# Patient Record
Sex: FEMALE | Race: WHITE | NOT HISPANIC OR LATINO | Employment: FULL TIME | URBAN - NONMETROPOLITAN AREA
[De-identification: names, ages, dates, MRNs, and addresses within clinical notes are randomized per-mention and may not be internally consistent; named-entity substitution may affect disease eponyms.]

---

## 2023-03-06 ENCOUNTER — TELEPHONE (OUTPATIENT)
Dept: PRIMARY CARE | Facility: CLINIC | Age: 48
End: 2023-03-06
Payer: COMMERCIAL

## 2023-03-06 DIAGNOSIS — G47.00 INSOMNIA, UNSPECIFIED TYPE: ICD-10-CM

## 2023-03-06 RX ORDER — ESZOPICLONE 3 MG/1
3 TABLET, FILM COATED ORAL NIGHTLY
Qty: 30 TABLET | Refills: 0 | Status: SHIPPED | OUTPATIENT
Start: 2023-03-06 | End: 2023-04-11 | Stop reason: SDUPTHER

## 2023-03-06 RX ORDER — ESZOPICLONE 3 MG/1
3 TABLET, FILM COATED ORAL NIGHTLY
COMMUNITY
End: 2023-03-06 | Stop reason: SDUPTHER

## 2023-04-11 ENCOUNTER — TELEPHONE (OUTPATIENT)
Dept: PRIMARY CARE | Facility: CLINIC | Age: 48
End: 2023-04-11

## 2023-04-11 DIAGNOSIS — G47.00 INSOMNIA, UNSPECIFIED TYPE: ICD-10-CM

## 2023-04-11 DIAGNOSIS — R73.03 PREDIABETES: ICD-10-CM

## 2023-04-11 PROBLEM — M25.561 RIGHT KNEE PAIN: Status: ACTIVE | Noted: 2023-04-11

## 2023-04-11 PROBLEM — M19.90 ARTHRITIS: Status: ACTIVE | Noted: 2023-04-11

## 2023-04-11 PROBLEM — M77.02 MEDIAL EPICONDYLITIS OF LEFT ELBOW: Status: ACTIVE | Noted: 2023-04-11

## 2023-04-11 PROBLEM — M25.529 JOINT PAIN, ELBOW: Status: ACTIVE | Noted: 2023-04-11

## 2023-04-11 PROBLEM — H69.91 DYSFUNCTION OF RIGHT EUSTACHIAN TUBE: Status: ACTIVE | Noted: 2023-04-11

## 2023-04-11 PROBLEM — M25.562 BILATERAL KNEE PAIN: Status: ACTIVE | Noted: 2023-04-11

## 2023-04-11 PROBLEM — R51.9 FREQUENT HEADACHES: Status: ACTIVE | Noted: 2023-04-11

## 2023-04-11 PROBLEM — M25.561 BILATERAL KNEE PAIN: Status: ACTIVE | Noted: 2023-04-11

## 2023-04-11 PROBLEM — M77.12 LATERAL EPICONDYLITIS OF LEFT ELBOW: Status: ACTIVE | Noted: 2023-04-11

## 2023-04-11 PROBLEM — M17.10 PRIMARY LOCALIZED OSTEOARTHROSIS OF LOWER LEG: Status: ACTIVE | Noted: 2023-04-11

## 2023-04-11 RX ORDER — ESZOPICLONE 3 MG/1
3 TABLET, FILM COATED ORAL NIGHTLY
Qty: 30 TABLET | Refills: 0 | Status: SHIPPED | OUTPATIENT
Start: 2023-04-11 | End: 2023-05-15 | Stop reason: SDUPTHER

## 2023-04-11 RX ORDER — TIRZEPATIDE 7.5 MG/.5ML
7.5 INJECTION, SOLUTION SUBCUTANEOUS
Qty: 2 ML | Refills: 1 | Status: SHIPPED | OUTPATIENT
Start: 2023-04-11 | End: 2023-04-22

## 2023-04-11 RX ORDER — TIRZEPATIDE 7.5 MG/.5ML
7.5 INJECTION, SOLUTION SUBCUTANEOUS
COMMUNITY
Start: 2023-03-17 | End: 2023-04-11 | Stop reason: SDUPTHER

## 2023-04-18 ENCOUNTER — TELEPHONE (OUTPATIENT)
Dept: PRIMARY CARE | Facility: CLINIC | Age: 48
End: 2023-04-18

## 2023-04-18 NOTE — TELEPHONE ENCOUNTER
Pt wants to know if she can move up to the next level higher for the 7.5mg  Mounjaro that she is taking.

## 2023-04-22 DIAGNOSIS — R73.03 PREDIABETES: Primary | ICD-10-CM

## 2023-04-22 RX ORDER — TIRZEPATIDE 10 MG/.5ML
10 INJECTION, SOLUTION SUBCUTANEOUS
Qty: 2 ML | Refills: 11 | Status: SHIPPED | OUTPATIENT
Start: 2023-04-22 | End: 2023-05-16 | Stop reason: SDUPTHER

## 2023-04-25 LAB
CHLAMYDIA TRACH., AMPLIFIED: NEGATIVE
CLUE CELLS: NORMAL
N. GONORRHEA, AMPLIFIED: NEGATIVE
NUGENT SCORE: 1
TRICHOMONAS VAGINALIS: NEGATIVE
URINE CULTURE: NORMAL
YEAST: NORMAL

## 2023-05-01 PROBLEM — H69.93 DYSFUNCTION OF BOTH EUSTACHIAN TUBES: Status: ACTIVE | Noted: 2020-06-29

## 2023-05-01 PROBLEM — M17.0 BILATERAL PRIMARY OSTEOARTHRITIS OF KNEE: Status: ACTIVE | Noted: 2020-01-29

## 2023-05-01 PROBLEM — G43.909 MIGRAINES: Status: ACTIVE | Noted: 2023-05-01

## 2023-05-01 PROBLEM — H93.8X1: Status: ACTIVE | Noted: 2020-06-29

## 2023-05-01 PROBLEM — Z96.652 STATUS POST TOTAL LEFT KNEE REPLACEMENT: Status: ACTIVE | Noted: 2021-02-22

## 2023-05-01 PROBLEM — H93.11 TINNITUS, RIGHT EAR: Status: ACTIVE | Noted: 2020-06-29

## 2023-05-01 PROBLEM — E66.813 OBESITY, CLASS III, BMI 40-49.9 (MORBID OBESITY): Status: ACTIVE | Noted: 2018-08-23

## 2023-05-01 PROBLEM — M17.12 PRIMARY OSTEOARTHRITIS OF LEFT KNEE: Status: ACTIVE | Noted: 2021-02-22

## 2023-05-01 PROBLEM — F41.9 ANXIETY: Status: ACTIVE | Noted: 2023-05-01

## 2023-05-01 PROBLEM — E66.01 OBESITY, CLASS III, BMI 40-49.9 (MORBID OBESITY) (MULTI): Status: ACTIVE | Noted: 2018-08-23

## 2023-05-11 ENCOUNTER — APPOINTMENT (OUTPATIENT)
Dept: PRIMARY CARE | Facility: CLINIC | Age: 48
End: 2023-05-11

## 2023-05-13 PROBLEM — M77.11 LATERAL EPICONDYLITIS OF RIGHT ELBOW: Status: ACTIVE | Noted: 2018-08-23

## 2023-05-13 RX ORDER — ESTRADIOL 1 MG/1
1 TABLET ORAL
COMMUNITY
Start: 2022-06-24 | End: 2023-11-28 | Stop reason: ALTCHOICE

## 2023-05-13 RX ORDER — PROGESTERONE 100 MG/1
100 CAPSULE ORAL
COMMUNITY
Start: 2022-06-24

## 2023-05-13 RX ORDER — DUTASTERIDE 0.5 MG/1
0.5 CAPSULE, LIQUID FILLED ORAL
COMMUNITY
Start: 2023-02-07

## 2023-05-13 RX ORDER — HYDROCHLOROTHIAZIDE 25 MG/1
1 TABLET ORAL
COMMUNITY
Start: 2021-01-04 | End: 2023-05-15 | Stop reason: SDUPTHER

## 2023-05-13 RX ORDER — SUMATRIPTAN SUCCINATE 100 MG/1
TABLET ORAL
COMMUNITY
Start: 2010-05-06 | End: 2023-11-28 | Stop reason: SDUPTHER

## 2023-05-15 ENCOUNTER — OFFICE VISIT (OUTPATIENT)
Dept: PRIMARY CARE | Facility: CLINIC | Age: 48
End: 2023-05-15
Payer: COMMERCIAL

## 2023-05-15 VITALS
DIASTOLIC BLOOD PRESSURE: 80 MMHG | HEART RATE: 84 BPM | HEIGHT: 66 IN | BODY MASS INDEX: 43.87 KG/M2 | SYSTOLIC BLOOD PRESSURE: 122 MMHG | OXYGEN SATURATION: 95 % | WEIGHT: 273 LBS

## 2023-05-15 DIAGNOSIS — F41.9 ANXIETY: ICD-10-CM

## 2023-05-15 DIAGNOSIS — E66.01 MORBID OBESITY WITH BMI OF 40.0-44.9, ADULT (MULTI): ICD-10-CM

## 2023-05-15 DIAGNOSIS — E55.9 VITAMIN D DEFICIENCY: ICD-10-CM

## 2023-05-15 DIAGNOSIS — R60.9 EDEMA, UNSPECIFIED TYPE: ICD-10-CM

## 2023-05-15 DIAGNOSIS — R21 RASH AND NONSPECIFIC SKIN ERUPTION: ICD-10-CM

## 2023-05-15 DIAGNOSIS — Z79.899 MEDICATION MANAGEMENT: Primary | ICD-10-CM

## 2023-05-15 DIAGNOSIS — Z96.652 STATUS POST TOTAL LEFT KNEE REPLACEMENT: ICD-10-CM

## 2023-05-15 DIAGNOSIS — M17.11 PRIMARY OSTEOARTHRITIS OF RIGHT KNEE: ICD-10-CM

## 2023-05-15 DIAGNOSIS — G47.00 PERSISTENT INSOMNIA: ICD-10-CM

## 2023-05-15 DIAGNOSIS — G43.009 MIGRAINE WITHOUT AURA AND WITHOUT STATUS MIGRAINOSUS, NOT INTRACTABLE: ICD-10-CM

## 2023-05-15 DIAGNOSIS — G47.00 INSOMNIA, UNSPECIFIED TYPE: ICD-10-CM

## 2023-05-15 DIAGNOSIS — D39.11 OVARIAN TUMOR OF BORDERLINE MALIGNANCY, RIGHT: ICD-10-CM

## 2023-05-15 DIAGNOSIS — H69.93 DYSFUNCTION OF BOTH EUSTACHIAN TUBES: ICD-10-CM

## 2023-05-15 DIAGNOSIS — E78.2 MIXED HYPERLIPIDEMIA: ICD-10-CM

## 2023-05-15 DIAGNOSIS — R73.03 PREDIABETES: ICD-10-CM

## 2023-05-15 PROBLEM — M77.12 LATERAL EPICONDYLITIS OF LEFT ELBOW: Status: RESOLVED | Noted: 2023-04-11 | Resolved: 2023-05-15

## 2023-05-15 PROBLEM — H93.8X1: Status: RESOLVED | Noted: 2020-06-29 | Resolved: 2023-05-15

## 2023-05-15 PROBLEM — M17.0 BILATERAL PRIMARY OSTEOARTHRITIS OF KNEE: Status: RESOLVED | Noted: 2020-01-29 | Resolved: 2023-05-15

## 2023-05-15 PROBLEM — M25.561 BILATERAL KNEE PAIN: Status: RESOLVED | Noted: 2023-04-11 | Resolved: 2023-05-15

## 2023-05-15 PROBLEM — H69.91 DYSFUNCTION OF RIGHT EUSTACHIAN TUBE: Status: RESOLVED | Noted: 2023-04-11 | Resolved: 2023-05-15

## 2023-05-15 PROBLEM — M77.02 MEDIAL EPICONDYLITIS OF LEFT ELBOW: Status: RESOLVED | Noted: 2023-04-11 | Resolved: 2023-05-15

## 2023-05-15 PROBLEM — M25.562 BILATERAL KNEE PAIN: Status: RESOLVED | Noted: 2023-04-11 | Resolved: 2023-05-15

## 2023-05-15 PROBLEM — M19.90 ARTHRITIS: Status: RESOLVED | Noted: 2023-04-11 | Resolved: 2023-05-15

## 2023-05-15 PROBLEM — F32.1 MODERATE MAJOR DEPRESSION, SINGLE EPISODE (MULTI): Status: RESOLVED | Noted: 2023-05-15 | Resolved: 2023-05-15

## 2023-05-15 PROBLEM — M17.10 PRIMARY LOCALIZED OSTEOARTHROSIS OF LOWER LEG: Status: RESOLVED | Noted: 2023-04-11 | Resolved: 2023-05-15

## 2023-05-15 PROBLEM — G43.709 NON-REFRACTORY CHRONIC MIGRAINE WITHOUT AURA: Status: ACTIVE | Noted: 2023-05-15

## 2023-05-15 PROBLEM — F32.1 MODERATE MAJOR DEPRESSION, SINGLE EPISODE (MULTI): Status: ACTIVE | Noted: 2023-05-15

## 2023-05-15 PROBLEM — M17.12 PRIMARY OSTEOARTHRITIS OF LEFT KNEE: Status: RESOLVED | Noted: 2021-02-22 | Resolved: 2023-05-15

## 2023-05-15 PROBLEM — G43.709 NON-REFRACTORY CHRONIC MIGRAINE WITHOUT AURA: Status: RESOLVED | Noted: 2023-05-15 | Resolved: 2023-05-15

## 2023-05-15 PROBLEM — R51.9 FREQUENT HEADACHES: Status: RESOLVED | Noted: 2023-04-11 | Resolved: 2023-05-15

## 2023-05-15 PROBLEM — M25.561 RIGHT KNEE PAIN: Status: RESOLVED | Noted: 2023-04-11 | Resolved: 2023-05-15

## 2023-05-15 PROBLEM — M77.11 LATERAL EPICONDYLITIS OF RIGHT ELBOW: Status: RESOLVED | Noted: 2018-08-23 | Resolved: 2023-05-15

## 2023-05-15 PROBLEM — H93.11 TINNITUS, RIGHT EAR: Status: RESOLVED | Noted: 2020-06-29 | Resolved: 2023-05-15

## 2023-05-15 PROBLEM — M25.529 JOINT PAIN, ELBOW: Status: RESOLVED | Noted: 2023-04-11 | Resolved: 2023-05-15

## 2023-05-15 LAB
AMPHETAMINE (PRESENCE) IN URINE BY SCREEN METHOD: NORMAL
BARBITURATES PRESENCE IN URINE BY SCREEN METHOD: NORMAL
BENZODIAZEPINE (PRESENCE) IN URINE BY SCREEN METHOD: NORMAL
CANNABINOIDS IN URINE BY SCREEN METHOD: NORMAL
COCAINE (PRESENCE) IN URINE BY SCREEN METHOD: NORMAL
DRUG SCREEN COMMENT URINE: NORMAL
FENTANYL URINE: NORMAL
METHADONE (PRESENCE) IN URINE BY SCREEN METHOD: NORMAL
OPIATES (PRESENCE) IN URINE BY SCREEN METHOD: NORMAL
OXYCODONE (PRESENCE) IN URINE BY SCREEN METHOD: NORMAL
PHENCYCLIDINE (PRESENCE) IN URINE BY SCREEN METHOD: NORMAL

## 2023-05-15 PROCEDURE — 3008F BODY MASS INDEX DOCD: CPT | Performed by: FAMILY MEDICINE

## 2023-05-15 PROCEDURE — 1036F TOBACCO NON-USER: CPT | Performed by: FAMILY MEDICINE

## 2023-05-15 PROCEDURE — 99214 OFFICE O/P EST MOD 30 MIN: CPT | Performed by: FAMILY MEDICINE

## 2023-05-15 PROCEDURE — 80307 DRUG TEST PRSMV CHEM ANLYZR: CPT

## 2023-05-15 RX ORDER — ESZOPICLONE 3 MG/1
3 TABLET, FILM COATED ORAL NIGHTLY
Qty: 30 TABLET | Refills: 0 | Status: SHIPPED | OUTPATIENT
Start: 2023-05-15 | End: 2023-06-29 | Stop reason: SDUPTHER

## 2023-05-15 RX ORDER — TRIAMCINOLONE ACETONIDE 5 MG/G
OINTMENT TOPICAL 2 TIMES DAILY
Qty: 30 G | Refills: 2 | Status: SHIPPED | OUTPATIENT
Start: 2023-05-15 | End: 2024-05-14

## 2023-05-15 RX ORDER — HYDROCHLOROTHIAZIDE 25 MG/1
25 TABLET ORAL DAILY
Qty: 30 TABLET | Refills: 11 | Status: SHIPPED | OUTPATIENT
Start: 2023-05-15 | End: 2023-11-28 | Stop reason: SINTOL

## 2023-05-15 RX ORDER — CHOLECALCIFEROL (VITAMIN D3) 1250 MCG
50000 TABLET ORAL
COMMUNITY
Start: 2016-08-25 | End: 2023-05-15 | Stop reason: SDUPTHER

## 2023-05-15 RX ORDER — VALACYCLOVIR HYDROCHLORIDE 1 G/1
1000 TABLET, FILM COATED ORAL EVERY 24 HOURS
COMMUNITY
Start: 2015-10-26 | End: 2023-11-10 | Stop reason: SDUPTHER

## 2023-05-15 RX ORDER — CHOLECALCIFEROL (VITAMIN D3) 1250 MCG
50000 TABLET ORAL
Qty: 12 TABLET | Refills: 3 | Status: SHIPPED | OUTPATIENT
Start: 2023-05-15 | End: 2024-04-01 | Stop reason: SDUPTHER

## 2023-05-15 ASSESSMENT — PATIENT HEALTH QUESTIONNAIRE - PHQ9
2. FEELING DOWN, DEPRESSED OR HOPELESS: SEVERAL DAYS
SUM OF ALL RESPONSES TO PHQ9 QUESTIONS 1 AND 2: 1
1. LITTLE INTEREST OR PLEASURE IN DOING THINGS: NOT AT ALL

## 2023-05-15 NOTE — PROGRESS NOTES
"Subjective   Patient ID: Brinda Espinosa is a 47 y.o. female who presents for Med Management (Check dry skin under nose, spots on right shin).    HPI  Anxiety  - had been doing well but recently laid off. Will see how she does.  She will be going to counseling.     Insomnia - on Lunesta not every day. 1/2 to 1 of them.   No urine drug screen.  So will do that.   CSA 5/15/23  UDS 5/15/23 as expected for medication profile     Obesity and Prediabetes  - She is on Mounjaro  7.5 and has lost 36 pounds and last A1C was 5.9% down from 6.3%.   Had a plateau for a couple weeks but now losing and  would like to lose more  No Chest pain, Dyspnea, palpitations, numbness, weakness, edema, claudication, or double vision/ loss of vision. Occasional racing heart with stress.     OA of the right knee and left TKR - still mild pain on left and on right. No meds now except on occasion.     Hyperlipidemia - no recent checks. No meds.  Will check next time.     ETD right more than left most of time.      Ovarian tumor borderline or precancerous. That has been removed. Gynecology follows.     Hair loss - on Dutasteride. Wonders if worse with Mounjaro.     Headaches - Migraines not as frequent. Throbbing, photophobia, and nausea. Better with hydration and limiting caffeine. Brand name Imitrex helps.     Edema at times - she is on HCTZ     Menopause on Hormone replacement. Aggravated by ovary removal. Dr Garcia. Had removed due to cyst. Negative for CA. Borderline on the left in 2010. Was told Ovarian tumor borderline or precancerous. That has been removed. Gynecology follows.     Rash on right shin for months. Pruritic       Review of Systems    Objective   /80 (BP Location: Right arm, Patient Position: Sitting)   Pulse 84   Ht 1.676 m (5' 6\")   Wt 124 kg (273 lb)   SpO2 95%   BMI 44.06 kg/m²     Physical Exam  Vitals reviewed.   Constitutional:       General: She is not in acute distress.     Appearance: Normal " appearance. She is obese.   HENT:      Head: Normocephalic.      Right Ear: Tympanic membrane normal.      Left Ear: Tympanic membrane normal.      Nose: Nose normal.      Mouth/Throat:      Pharynx: Oropharynx is clear.   Eyes:      Extraocular Movements: Extraocular movements intact.      Conjunctiva/sclera: Conjunctivae normal.      Pupils: Pupils are equal, round, and reactive to light.   Neck:      Vascular: No carotid bruit.   Cardiovascular:      Rate and Rhythm: Normal rate and regular rhythm.      Pulses: Normal pulses.      Heart sounds: Normal heart sounds. No murmur heard.  Pulmonary:      Effort: Pulmonary effort is normal. No respiratory distress.      Breath sounds: Normal breath sounds.   Abdominal:      General: Abdomen is flat. Bowel sounds are normal. There is no distension.      Palpations: Abdomen is soft. There is no mass.      Tenderness: There is no abdominal tenderness.   Musculoskeletal:      Cervical back: Normal range of motion and neck supple. No tenderness.   Lymphadenopathy:      Cervical: No cervical adenopathy.   Skin:     General: Skin is warm and dry.      Findings: Rash (scaly excoriated rash on right shin,. Not red,) present.   Neurological:      General: No focal deficit present.      Mental Status: She is alert and oriented to person, place, and time.   Psychiatric:         Mood and Affect: Mood normal.         Thought Content: Thought content normal.         Judgment: Judgment normal.         Assessment/Plan   Problem List Items Addressed This Visit       Anxiety    Dysfunction of both eustachian tubes    Migraines    Mixed hyperlipidemia    Relevant Orders    Lipid Panel    Morbid obesity with BMI of 40.0-44.9, adult (CMS/HCC)    Relevant Orders    Follow Up In Primary Care    Ovarian tumor of borderline malignancy, right    Persistent insomnia    Prediabetes    Relevant Orders    Hemoglobin A1C    Lipid Panel    Follow Up In Primary Care    Primary osteoarthritis of right  knee    Status post total left knee replacement    Vitamin D deficiency    Relevant Medications    cholecalciferol (Vitamin D3) 1,250 mcg (50,000 unit) tablet    Other Relevant Orders    Vitamin D 1,25 Dihydroxy     Other Visit Diagnoses       Medication management    -  Primary    Relevant Orders    DRUG SCREEN,URINE    Insomnia, unspecified type        Relevant Medications    eszopiclone (Lunesta) 3 mg tablet    Edema, unspecified type        Relevant Medications    hydroCHLOROthiazide (HYDRODiuril) 25 mg tablet    Other Relevant Orders    Comprehensive Metabolic Panel    CBC    Rash and nonspecific skin eruption        Relevant Medications    triamcinolone (Kenalog) 0.5 % ointment

## 2023-05-16 ENCOUNTER — TELEPHONE (OUTPATIENT)
Dept: PRIMARY CARE | Facility: CLINIC | Age: 48
End: 2023-05-16

## 2023-05-16 DIAGNOSIS — R73.03 PREDIABETES: ICD-10-CM

## 2023-05-16 RX ORDER — TIRZEPATIDE 10 MG/.5ML
10 INJECTION, SOLUTION SUBCUTANEOUS
Qty: 2 ML | Refills: 11 | Status: SHIPPED
Start: 2023-05-16 | End: 2023-07-13 | Stop reason: ALTCHOICE

## 2023-05-16 NOTE — TELEPHONE ENCOUNTER
Call from patient stating [facility] has the mounjaro on back order. Asking for it to be sent to [facility] on [address]. Please disregard the message from [facility] pharmacy.

## 2023-05-16 NOTE — TELEPHONE ENCOUNTER
1. Have you been to the ER, urgent care clinic since your last visit? Hospitalized since your last visit? Patient was discharged from [facility] 9-     2. Have you seen or consulted any other health care providers outside of the [address] [place] [address] since your last visit? Include any pap smears or colon screening. Had EGD AND COLONOSCOPY  AT [facility] ON 8-. Rx proposed

## 2023-06-29 ENCOUNTER — TELEPHONE (OUTPATIENT)
Dept: PRIMARY CARE | Facility: CLINIC | Age: 48
End: 2023-06-29

## 2023-06-29 DIAGNOSIS — G47.00 INSOMNIA, UNSPECIFIED TYPE: ICD-10-CM

## 2023-06-29 RX ORDER — ESZOPICLONE 3 MG/1
3 TABLET, FILM COATED ORAL NIGHTLY
Qty: 30 TABLET | Refills: 0 | Status: SHIPPED | OUTPATIENT
Start: 2023-06-29 | End: 2023-08-15 | Stop reason: SDUPTHER

## 2023-07-13 ENCOUNTER — TELEPHONE (OUTPATIENT)
Dept: PRIMARY CARE | Facility: CLINIC | Age: 48
End: 2023-07-13

## 2023-07-13 DIAGNOSIS — R73.03 PREDIABETES: ICD-10-CM

## 2023-07-13 RX ORDER — TIRZEPATIDE 12.5 MG/.5ML
12.5 INJECTION, SOLUTION SUBCUTANEOUS
Qty: 2 ML | Refills: 11 | Status: SHIPPED | OUTPATIENT
Start: 2023-07-13 | End: 2023-09-11

## 2023-08-10 RX ORDER — HYDROCORTISONE 25 MG/G
1 OINTMENT TOPICAL 2 TIMES DAILY PRN
COMMUNITY
Start: 2023-02-08

## 2023-08-15 ENCOUNTER — OFFICE VISIT (OUTPATIENT)
Dept: PRIMARY CARE | Facility: CLINIC | Age: 48
End: 2023-08-15
Payer: COMMERCIAL

## 2023-08-15 ENCOUNTER — TELEPHONE (OUTPATIENT)
Dept: PRIMARY CARE | Facility: CLINIC | Age: 48
End: 2023-08-15

## 2023-08-15 ENCOUNTER — LAB (OUTPATIENT)
Dept: LAB | Facility: LAB | Age: 48
End: 2023-08-15
Payer: COMMERCIAL

## 2023-08-15 VITALS
WEIGHT: 255 LBS | DIASTOLIC BLOOD PRESSURE: 72 MMHG | OXYGEN SATURATION: 94 % | SYSTOLIC BLOOD PRESSURE: 124 MMHG | HEART RATE: 83 BPM | BODY MASS INDEX: 41.16 KG/M2

## 2023-08-15 DIAGNOSIS — E78.2 MIXED HYPERLIPIDEMIA: ICD-10-CM

## 2023-08-15 DIAGNOSIS — G47.00 PERSISTENT INSOMNIA: ICD-10-CM

## 2023-08-15 DIAGNOSIS — F41.9 ANXIETY: Primary | ICD-10-CM

## 2023-08-15 DIAGNOSIS — R73.03 PREDIABETES: ICD-10-CM

## 2023-08-15 DIAGNOSIS — G43.009 MIGRAINE WITHOUT AURA AND WITHOUT STATUS MIGRAINOSUS, NOT INTRACTABLE: ICD-10-CM

## 2023-08-15 DIAGNOSIS — G47.00 INSOMNIA, UNSPECIFIED TYPE: ICD-10-CM

## 2023-08-15 DIAGNOSIS — H69.93 DYSFUNCTION OF BOTH EUSTACHIAN TUBES: ICD-10-CM

## 2023-08-15 DIAGNOSIS — E66.01 MORBID OBESITY WITH BMI OF 40.0-44.9, ADULT (MULTI): ICD-10-CM

## 2023-08-15 DIAGNOSIS — M17.11 PRIMARY OSTEOARTHRITIS OF RIGHT KNEE: ICD-10-CM

## 2023-08-15 DIAGNOSIS — R60.9 EDEMA, UNSPECIFIED TYPE: ICD-10-CM

## 2023-08-15 DIAGNOSIS — D39.11 OVARIAN TUMOR OF BORDERLINE MALIGNANCY, RIGHT: ICD-10-CM

## 2023-08-15 DIAGNOSIS — E87.6 HYPOKALEMIA: Primary | ICD-10-CM

## 2023-08-15 LAB
ALANINE AMINOTRANSFERASE (SGPT) (U/L) IN SER/PLAS: 25 U/L (ref 7–45)
ALBUMIN (G/DL) IN SER/PLAS: 4.1 G/DL (ref 3.4–5)
ALKALINE PHOSPHATASE (U/L) IN SER/PLAS: 62 U/L (ref 33–110)
ANION GAP IN SER/PLAS: 12 MMOL/L (ref 10–20)
ASPARTATE AMINOTRANSFERASE (SGOT) (U/L) IN SER/PLAS: 22 U/L (ref 9–39)
BILIRUBIN TOTAL (MG/DL) IN SER/PLAS: 0.6 MG/DL (ref 0–1.2)
CALCIUM (MG/DL) IN SER/PLAS: 9.3 MG/DL (ref 8.6–10.3)
CARBON DIOXIDE, TOTAL (MMOL/L) IN SER/PLAS: 28 MMOL/L (ref 21–32)
CHLORIDE (MMOL/L) IN SER/PLAS: 102 MMOL/L (ref 98–107)
CHOLESTEROL (MG/DL) IN SER/PLAS: 225 MG/DL (ref 0–199)
CHOLESTEROL IN HDL (MG/DL) IN SER/PLAS: 45 MG/DL
CHOLESTEROL/HDL RATIO: 5
CREATININE (MG/DL) IN SER/PLAS: 0.72 MG/DL (ref 0.5–1.05)
ERYTHROCYTE DISTRIBUTION WIDTH (RATIO) BY AUTOMATED COUNT: 13.1 % (ref 11.5–14.5)
ERYTHROCYTE MEAN CORPUSCULAR HEMOGLOBIN CONCENTRATION (G/DL) BY AUTOMATED: 32.4 G/DL (ref 32–36)
ERYTHROCYTE MEAN CORPUSCULAR VOLUME (FL) BY AUTOMATED COUNT: 91 FL (ref 80–100)
ERYTHROCYTES (10*6/UL) IN BLOOD BY AUTOMATED COUNT: 4.95 X10E12/L (ref 4–5.2)
ESTIMATED AVERAGE GLUCOSE FOR HBA1C: 114 MG/DL
GFR FEMALE: >90 ML/MIN/1.73M2
GLUCOSE (MG/DL) IN SER/PLAS: 101 MG/DL (ref 74–99)
HEMATOCRIT (%) IN BLOOD BY AUTOMATED COUNT: 45.1 % (ref 36–46)
HEMOGLOBIN (G/DL) IN BLOOD: 14.6 G/DL (ref 12–16)
HEMOGLOBIN A1C/HEMOGLOBIN TOTAL IN BLOOD: 5.6 %
LDL: 162 MG/DL (ref 0–99)
LEUKOCYTES (10*3/UL) IN BLOOD BY AUTOMATED COUNT: 6.3 X10E9/L (ref 4.4–11.3)
PLATELETS (10*3/UL) IN BLOOD AUTOMATED COUNT: 354 X10E9/L (ref 150–450)
POTASSIUM (MMOL/L) IN SER/PLAS: 2.9 MMOL/L (ref 3.5–5.3)
PROTEIN TOTAL: 7.1 G/DL (ref 6.4–8.2)
SODIUM (MMOL/L) IN SER/PLAS: 139 MMOL/L (ref 136–145)
TRIGLYCERIDE (MG/DL) IN SER/PLAS: 89 MG/DL (ref 0–149)
UREA NITROGEN (MG/DL) IN SER/PLAS: 12 MG/DL (ref 6–23)
VLDL: 18 MG/DL (ref 0–40)

## 2023-08-15 PROCEDURE — 36415 COLL VENOUS BLD VENIPUNCTURE: CPT

## 2023-08-15 PROCEDURE — 80053 COMPREHEN METABOLIC PANEL: CPT

## 2023-08-15 PROCEDURE — 1036F TOBACCO NON-USER: CPT | Performed by: FAMILY MEDICINE

## 2023-08-15 PROCEDURE — 83036 HEMOGLOBIN GLYCOSYLATED A1C: CPT

## 2023-08-15 PROCEDURE — 85027 COMPLETE CBC AUTOMATED: CPT

## 2023-08-15 PROCEDURE — 80061 LIPID PANEL: CPT

## 2023-08-15 PROCEDURE — 3008F BODY MASS INDEX DOCD: CPT | Performed by: FAMILY MEDICINE

## 2023-08-15 PROCEDURE — 99214 OFFICE O/P EST MOD 30 MIN: CPT | Performed by: FAMILY MEDICINE

## 2023-08-15 RX ORDER — ESZOPICLONE 3 MG/1
3 TABLET, FILM COATED ORAL NIGHTLY
Qty: 30 TABLET | Refills: 0 | Status: SHIPPED | OUTPATIENT
Start: 2023-08-15 | End: 2023-09-22 | Stop reason: SDUPTHER

## 2023-08-15 RX ORDER — POTASSIUM CHLORIDE 1500 MG/1
20 TABLET, EXTENDED RELEASE ORAL 2 TIMES DAILY
Qty: 60 TABLET | Refills: 11 | Status: SHIPPED | OUTPATIENT
Start: 2023-08-15 | End: 2023-08-15 | Stop reason: SDUPTHER

## 2023-08-15 RX ORDER — POTASSIUM CHLORIDE 1500 MG/1
20 TABLET, EXTENDED RELEASE ORAL 2 TIMES DAILY
Qty: 30 TABLET | Refills: 1 | Status: SHIPPED | OUTPATIENT
Start: 2023-08-15 | End: 2023-09-14

## 2023-08-15 NOTE — RESULT ENCOUNTER NOTE
Let her know the potassium is low so she needs to supplement and repeat in a week. I sent in medication for her and an order for potassium.   Sugar, kidneys and blood cells all look good.  Cholesterol is a little high so needs to watch fats in diet.

## 2023-08-15 NOTE — TELEPHONE ENCOUNTER
----- Message from William Nieto MD sent at 8/15/2023  2:44 PM EDT -----  Let her know the potassium is low so she needs to supplement and repeat in a week. I sent in medication for her and an order for potassium.   Sugar, kidneys and blood cells all look good.  Cholesterol is a little high so needs to watch fats in diet.

## 2023-08-15 NOTE — PROGRESS NOTES
Subjective   Patient ID: Brinda Espinosa is a 48 y.o. female who presents for Med Management.    HPI   Anxiety  - had been doing well but recently laid off. Her mothers health issues with memory raise the stress.   She has been to counseling a few times but scheduling issues. [facility] in [place].      Insomnia - on Lunesta not every day. 1/2 to 1 of them.   No urine drug screen.  So will do that.   CSA 5/15/23  UDS 5/15/23 as expected for medication profile      Obesity and Prediabetes  - She is on Mounjaro  12.5 and had lost 36 pounds and now another 18 pounds. and last A1C was 5.9% in February down from 6.3%.    No Chest pain, Dyspnea, palpitations, numbness, weakness, edema, claudication, or double vision/ loss of vision. Occasional racing heart with stress. Wygovy would be covered till end of the year. So may switch to that. 1.7 mg.      OA of the right knee and left TKR - still mild pain on left and more on right. No meds now except on occasion. Weight loss does help.      Hyperlipidemia - no recent checks. No meds.  Will check today      ETD right more than left most of time.       Ovarian tumor borderline or precancerous. That has been removed. Gynecology follows.. tumor markers negative in August.      Hair loss - on Dutasteride. Wonders if worse with Mounjaro and weight loss.     Headaches - Migraines not as frequent. Throbbing, photophobia, and nausea. Better with hydration and limiting caffeine. Brand name Imitrex helps.      Edema at times - she is on HCTZ      Menopause on Hormone replacement. Aggravated by ovary removal. Dr Garcia.      Rash on right shin for months. Pruritic. Better with steroid cream and better.     Cologuard declined at present     Review of Systems    Objective   /72 (BP Location: Left arm, Patient Position: Sitting)   Pulse 83   Wt 116 kg (255 lb)   SpO2 94%   BMI 41.16 kg/m²     Physical Exam  Vitals reviewed.   Constitutional:       General: She is not in acute  distress.     Appearance: Normal appearance. She is obese.   HENT:      Head: Normocephalic.      Right Ear: Tympanic membrane normal.      Left Ear: Tympanic membrane normal.      Nose: Nose normal.      Mouth/Throat:      Pharynx: Oropharynx is clear.   Eyes:      Extraocular Movements: Extraocular movements intact.      Conjunctiva/sclera: Conjunctivae normal.      Pupils: Pupils are equal, round, and reactive to light.   Neck:      Vascular: No carotid bruit.   Cardiovascular:      Rate and Rhythm: Normal rate and regular rhythm.      Pulses: Normal pulses.      Heart sounds: Normal heart sounds. No murmur heard.  Pulmonary:      Effort: Pulmonary effort is normal. No respiratory distress.      Breath sounds: Normal breath sounds.   Abdominal:      General: Abdomen is flat. Bowel sounds are normal. There is no distension.      Palpations: Abdomen is soft. There is no mass.      Tenderness: There is no abdominal tenderness.   Musculoskeletal:      Cervical back: Normal range of motion and neck supple. No tenderness.   Lymphadenopathy:      Cervical: No cervical adenopathy.   Skin:     General: Skin is warm and dry.      Findings: No rash.   Neurological:      General: No focal deficit present.      Mental Status: She is alert and oriented to person, place, and time.   Psychiatric:         Mood and Affect: Mood normal.         Thought Content: Thought content normal.         Judgment: Judgment normal.         Assessment/Plan   Problem List Items Addressed This Visit       Anxiety - Primary    Dysfunction of both eustachian tubes    Migraines    Mixed hyperlipidemia    Morbid obesity with BMI of 40.0-44.9, adult (CMS/[facility])    Ovarian tumor of borderline malignancy, right    Persistent insomnia    Prediabetes    Relevant Orders    Follow Up In Primary Care - Established    Primary osteoarthritis of right knee     Other Visit Diagnoses       Insomnia, unspecified type        Relevant Medications    eszopiclone  (Lunesta) 3 mg tablet

## 2023-09-11 ENCOUNTER — TELEPHONE (OUTPATIENT)
Dept: PRIMARY CARE | Facility: CLINIC | Age: 48
End: 2023-09-11

## 2023-09-11 DIAGNOSIS — R73.03 PREDIABETES: ICD-10-CM

## 2023-09-11 DIAGNOSIS — E66.01 MORBID OBESITY WITH BMI OF 40.0-44.9, ADULT (MULTI): Primary | ICD-10-CM

## 2023-09-11 RX ORDER — TIRZEPATIDE 15 MG/.5ML
15 INJECTION, SOLUTION SUBCUTANEOUS
Qty: 2 ML | Refills: 11 | Status: SHIPPED | OUTPATIENT
Start: 2023-09-11 | End: 2023-11-10 | Stop reason: SDUPTHER

## 2023-09-11 NOTE — TELEPHONE ENCOUNTER
Would like to know if her Mounjaro can be increased as she has stopped losing weight- [facility]

## 2023-09-14 ENCOUNTER — OFFICE VISIT (OUTPATIENT)
Dept: PRIMARY CARE | Facility: CLINIC | Age: 48
End: 2023-09-14
Payer: COMMERCIAL

## 2023-09-14 ENCOUNTER — TELEPHONE (OUTPATIENT)
Dept: PRIMARY CARE | Facility: CLINIC | Age: 48
End: 2023-09-14

## 2023-09-14 VITALS
HEIGHT: 66 IN | SYSTOLIC BLOOD PRESSURE: 122 MMHG | BODY MASS INDEX: 41.51 KG/M2 | HEART RATE: 80 BPM | WEIGHT: 258.3 LBS | DIASTOLIC BLOOD PRESSURE: 80 MMHG

## 2023-09-14 DIAGNOSIS — M10.9 ACUTE GOUT INVOLVING TOE OF RIGHT FOOT, UNSPECIFIED CAUSE: Primary | ICD-10-CM

## 2023-09-14 PROCEDURE — 3008F BODY MASS INDEX DOCD: CPT | Performed by: NURSE PRACTITIONER

## 2023-09-14 PROCEDURE — 99213 OFFICE O/P EST LOW 20 MIN: CPT | Performed by: NURSE PRACTITIONER

## 2023-09-14 PROCEDURE — 1036F TOBACCO NON-USER: CPT | Performed by: NURSE PRACTITIONER

## 2023-09-14 RX ORDER — METHYLPREDNISOLONE 4 MG/1
TABLET ORAL
Qty: 21 TABLET | Refills: 0 | Status: SHIPPED | OUTPATIENT
Start: 2023-09-14 | End: 2023-09-28 | Stop reason: ALTCHOICE

## 2023-09-14 NOTE — PROGRESS NOTES
"Subjective   Patient ID: Brinda Espinosa is a 48 y.o. female who presents for Foot Pain (Started Monday - big toe is worse).    Concerned for a gout   Has pain and swelling   Started on Monday, worsened yesterday  Right great toe, pain, tender to touch, swollen and redness  No prior history of gout, denies family hx of gout  Recently start on potassium supplement otherwise no now change in medication or habit              Review of Systems   Constitutional:  Negative for fatigue and fever.   Respiratory:  Negative for shortness of breath.    Cardiovascular:  Negative for chest pain.   Musculoskeletal:  Positive for joint swelling and myalgias.   Skin:  Positive for color change.   Neurological:  Negative for dizziness, weakness and light-headedness.       Objective   /80 (Patient Position: Sitting)   Pulse 80   Ht 1.676 m (5' 6\")   Wt 117 kg (258 lb 4.8 oz)   BMI 41.69 kg/m²     Physical Exam  Vitals reviewed.   Constitutional:       Appearance: Normal appearance. She is obese.   Cardiovascular:      Rate and Rhythm: Normal rate and regular rhythm.   Musculoskeletal:      Comments: Right great toe edema, erythema, and tender to touch    Feet:      Right foot:      Skin integrity: Erythema present.      Comments: Edema to right great toe, tender to touch  Skin:     Findings: Erythema (right great toe) present.   Neurological:      Mental Status: She is alert.         Assessment/Plan   Diagnoses and all orders for this visit:  Acute gout involving toe of right foot, unspecified cause  -     methylPREDNISolone (Medrol Dospak) 4 mg tablets; Take as directed on package.  Advised to avoid taking any OTC NSAID while on steroid.  Follow up as needed with PCP        "

## 2023-09-14 NOTE — TELEPHONE ENCOUNTER
Reporting that she is having difficulty walking, thinks she may have gout in her big toe- please call

## 2023-09-15 ASSESSMENT — ENCOUNTER SYMPTOMS
LIGHT-HEADEDNESS: 0
COLOR CHANGE: 1
FEVER: 0
FATIGUE: 0
JOINT SWELLING: 1
MYALGIAS: 1
WEAKNESS: 0
SHORTNESS OF BREATH: 0
DIZZINESS: 0

## 2023-09-21 ENCOUNTER — TELEPHONE (OUTPATIENT)
Dept: PRIMARY CARE | Facility: CLINIC | Age: 48
End: 2023-09-21

## 2023-09-21 DIAGNOSIS — G47.00 INSOMNIA, UNSPECIFIED TYPE: ICD-10-CM

## 2023-09-21 NOTE — TELEPHONE ENCOUNTER
Finished medication for gout not feeling any better. Asking how long it should take to start to work. Also needs a refill of lunesta.

## 2023-09-22 RX ORDER — ESZOPICLONE 3 MG/1
3 TABLET, FILM COATED ORAL NIGHTLY
Qty: 30 TABLET | Refills: 0 | Status: SHIPPED | OUTPATIENT
Start: 2023-09-22 | End: 2023-10-30 | Stop reason: SDUPTHER

## 2023-09-25 ENCOUNTER — APPOINTMENT (OUTPATIENT)
Dept: PRIMARY CARE | Facility: CLINIC | Age: 48
End: 2023-09-25

## 2023-09-28 ENCOUNTER — OFFICE VISIT (OUTPATIENT)
Dept: PRIMARY CARE | Facility: CLINIC | Age: 48
End: 2023-09-28
Payer: COMMERCIAL

## 2023-09-28 VITALS
DIASTOLIC BLOOD PRESSURE: 72 MMHG | OXYGEN SATURATION: 98 % | WEIGHT: 254 LBS | HEART RATE: 74 BPM | SYSTOLIC BLOOD PRESSURE: 128 MMHG | BODY MASS INDEX: 41 KG/M2

## 2023-09-28 DIAGNOSIS — E87.6 HYPOKALEMIA: ICD-10-CM

## 2023-09-28 DIAGNOSIS — M10.9 GOUTY ARTHRITIS OF GREAT TOE: Primary | ICD-10-CM

## 2023-09-28 PROCEDURE — 99213 OFFICE O/P EST LOW 20 MIN: CPT | Performed by: FAMILY MEDICINE

## 2023-09-28 PROCEDURE — 3008F BODY MASS INDEX DOCD: CPT | Performed by: FAMILY MEDICINE

## 2023-09-28 PROCEDURE — 1036F TOBACCO NON-USER: CPT | Performed by: FAMILY MEDICINE

## 2023-09-28 NOTE — PROGRESS NOTES
Subjective   Patient ID: Brinda Espinosa is a 48 y.o. female who presents for Follow-up (Poss gout right foot).    HPI   She still has pain in the right great toe.   Has been treated for gout with medrol dosepak and swelling and pain are decreased but still puffy and some pain with walking and wearing shoes  She has been taking Tylenol.   Has not taken Ibuprofen because she recalls was told CKD. Last GFR was over 90.   Had low potassium last time.  She is on hydrochlorothiazide and Avodart.   Weight has plateaued at BMI 41 with this all in spite of Mounjaro.   Sugars have been running good. A1C 5.6%  Drinking a lot water           Review of Systems    Objective   /72 (BP Location: Right arm, Patient Position: Sitting)   Pulse 74   Wt 115 kg (254 lb)   SpO2 98%   BMI 41.00 kg/m²     Physical Exam  Constitutional:       Appearance: Normal appearance.   Cardiovascular:      Rate and Rhythm: Normal rate and regular rhythm.      Heart sounds: No murmur heard.  Pulmonary:      Effort: Pulmonary effort is normal. No respiratory distress.      Breath sounds: Normal breath sounds. No wheezing.   Neurological:      Mental Status: She is alert.     Right great toe with tenderness of IP joint not MP.  Will have her use Voltaren Gel.   A bit puffy  Not red  Good ROM.   Tendon not tendon.     Assessment/Plan   Problem List Items Addressed This Visit             ICD-10-CM    Gouty arthritis of great toe - Primary M10.9    Relevant Orders    XR toe right 2+ views    C-Reactive Protein    Sedimentation Rate    Hypokalemia E87.6    Relevant Orders    Basic Metabolic Panel

## 2023-09-29 ENCOUNTER — LAB (OUTPATIENT)
Dept: LAB | Facility: LAB | Age: 48
End: 2023-09-29
Payer: COMMERCIAL

## 2023-09-29 DIAGNOSIS — E87.6 HYPOKALEMIA: ICD-10-CM

## 2023-09-29 DIAGNOSIS — M10.9 GOUTY ARTHRITIS OF GREAT TOE: ICD-10-CM

## 2023-09-29 LAB
ANION GAP IN SER/PLAS: 12 MMOL/L (ref 10–20)
C REACTIVE PROTEIN (MG/L) IN SER/PLAS: 1.91 MG/DL
CALCIUM (MG/DL) IN SER/PLAS: 9.7 MG/DL (ref 8.6–10.3)
CARBON DIOXIDE, TOTAL (MMOL/L) IN SER/PLAS: 30 MMOL/L (ref 21–32)
CHLORIDE (MMOL/L) IN SER/PLAS: 101 MMOL/L (ref 98–107)
CREATININE (MG/DL) IN SER/PLAS: 0.76 MG/DL (ref 0.5–1.05)
GFR FEMALE: >90 ML/MIN/1.73M2
GLUCOSE (MG/DL) IN SER/PLAS: 130 MG/DL (ref 74–99)
POTASSIUM (MMOL/L) IN SER/PLAS: 3.3 MMOL/L (ref 3.5–5.3)
SEDIMENTATION RATE, ERYTHROCYTE: 44 MM/H (ref 0–20)
SODIUM (MMOL/L) IN SER/PLAS: 140 MMOL/L (ref 136–145)
URATE (MG/DL) IN SER/PLAS: 9.1 MG/DL (ref 2.3–6.7)
UREA NITROGEN (MG/DL) IN SER/PLAS: 9 MG/DL (ref 6–23)

## 2023-09-29 PROCEDURE — 80048 BASIC METABOLIC PNL TOTAL CA: CPT

## 2023-09-29 PROCEDURE — 36415 COLL VENOUS BLD VENIPUNCTURE: CPT

## 2023-09-29 PROCEDURE — 85652 RBC SED RATE AUTOMATED: CPT

## 2023-09-29 PROCEDURE — 84550 ASSAY OF BLOOD/URIC ACID: CPT

## 2023-09-29 PROCEDURE — 86140 C-REACTIVE PROTEIN: CPT

## 2023-09-29 NOTE — RESULT ENCOUNTER NOTE
Notify no fracture or arthritic changes seen.  Changes to cartilage in the joint will not be seen. So keep on the Voltaren.

## 2023-10-02 DIAGNOSIS — E66.01 MORBID OBESITY WITH BMI OF 40.0-44.9, ADULT (MULTI): ICD-10-CM

## 2023-10-02 DIAGNOSIS — M10.9 ACUTE GOUT INVOLVING TOE OF RIGHT FOOT, UNSPECIFIED CAUSE: Primary | ICD-10-CM

## 2023-10-02 RX ORDER — COLCHICINE 0.6 MG/1
0.6 TABLET ORAL 2 TIMES DAILY
Qty: 60 TABLET | Refills: 0 | Status: SHIPPED | OUTPATIENT
Start: 2023-10-02 | End: 2023-11-01

## 2023-10-02 NOTE — RESULT ENCOUNTER NOTE
I spoke with Brinda about her results. She does have high uric acid as well as moderately elevated ESR and CRP. That is consistent with gout. But also with other rheumatologic disorders.  The toe is no better so will add colchicine sent in pharmacy. Keep on Voltaren.  Xray was negative.  She would like to see a dietician about her diet due to obesity and the gout.  That was ordered.   If she continues to have symptoms will send to rheumatology.

## 2023-10-30 ENCOUNTER — TELEPHONE (OUTPATIENT)
Dept: PRIMARY CARE | Facility: CLINIC | Age: 48
End: 2023-10-30

## 2023-10-30 DIAGNOSIS — G47.00 INSOMNIA, UNSPECIFIED TYPE: ICD-10-CM

## 2023-10-30 RX ORDER — ESZOPICLONE 3 MG/1
3 TABLET, FILM COATED ORAL NIGHTLY
Qty: 30 TABLET | Refills: 0 | Status: SHIPPED | OUTPATIENT
Start: 2023-10-30 | End: 2023-11-28 | Stop reason: SDUPTHER

## 2023-11-01 PROBLEM — L65.9 HAIR LOSS: Status: ACTIVE | Noted: 2023-11-01

## 2023-11-01 PROBLEM — R60.9 EDEMA: Status: ACTIVE | Noted: 2023-11-01

## 2023-11-01 RX ORDER — ESTRADIOL AND NORETHINDRONE ACETATE 1; .5 MG/1; MG/1
1 TABLET ORAL
COMMUNITY
Start: 2023-10-03 | End: 2023-11-28 | Stop reason: ALTCHOICE

## 2023-11-01 RX ORDER — CONJUGATED ESTROGENS AND MEDROXYPROGESTERONE ACETATE .45; 1.5 MG/1; MG/1
1 TABLET, SUGAR COATED ORAL DAILY
COMMUNITY
Start: 2023-10-13 | End: 2023-11-28 | Stop reason: ALTCHOICE

## 2023-11-02 ENCOUNTER — NUTRITION (OUTPATIENT)
Dept: NUTRITION | Facility: HOSPITAL | Age: 48
End: 2023-11-02
Payer: COMMERCIAL

## 2023-11-02 DIAGNOSIS — E66.01 MORBID OBESITY WITH BMI OF 40.0-44.9, ADULT (MULTI): ICD-10-CM

## 2023-11-02 DIAGNOSIS — M10.9 ACUTE GOUT INVOLVING TOE OF RIGHT FOOT, UNSPECIFIED CAUSE: ICD-10-CM

## 2023-11-02 PROCEDURE — 97802 MEDICAL NUTRITION INDIV IN: CPT | Performed by: FAMILY MEDICINE

## 2023-11-02 NOTE — PATIENT INSTRUCTIONS
1) Follow plate method when planning meals (1/2 plate non-starchy vegetables, 1/4 plate lean protein, 1/4 plate carbohydrates).   2) Increase fiber from fruits, vegetables, whole grains, and beans/lentils  3) Choose lean protein sources including chicken, turkey, seafood, and eggs.   4) Aim to include more plant-based sources of protein including tofu, beans, lentils, nuts, nut butters, and pea protein  5) aim for at least 3 meals per day - be sure to include protein with each meal and snack  6) Pt to initiate exercise regimen/plan for winter exercise

## 2023-11-02 NOTE — PROGRESS NOTES
Reason for Nutrition Visit:  Pt is a 48 y.o. female referred for   1. Acute gout involving toe of right foot, unspecified cause  Referral to Nutrition Services      2. Morbid obesity with BMI of 40.0-44.9, adult (CMS/[facility])  Referral to Nutrition Services           Past Medical Hx:  Patient Active Problem List   Diagnosis    Prediabetes    Anxiety    Migraines    Morbid obesity with BMI of 40.0-44.9, adult (CMS/[facility])    Ovarian tumor of borderline malignancy, right    Pes planus    Status post total left knee replacement    Dysfunction of both eustachian tubes    Vitamin D deficiency    Persistent insomnia    Mixed hyperlipidemia    Primary osteoarthritis of right knee    Gouty arthritis of great toe    Hypokalemia    Edema    Hair loss        Weight change:    Significant Weight Change: No  Pt reports 50 lb intentional wt loss in the past year    Lab Results   Component Value Date    HGBA1C 5.6 08/15/2023    HGBA1C 6.0 (H) 06/30/2021    CHOL 225 (H) 08/15/2023    LDLF 162 (H) 08/15/2023    TRIG 89 08/15/2023        Food and Nutrition Hx:  On Mounjaro for 1 year - is stopping due to cost (currently unemployed), lost ~55 lbs in the past year. Can get Wegovy free until the end of the year so may be switching to that for the rest of the year. Pt reports reccent flare of gout - on medication   Trying to eat fish 1x/week  Does not typically eat after dinner  Has not been tracking intake but states was was in the past  Stress affects appetite - has been under stress recently and has not felt like eating  With Mounjaro eating 1 meal/day and usually something smaller later    24 Diet Recall:  Meal 1: oatmeal or fiber bar  Meal 2: yogurt or lunch meat sandwich or time when she is not hungry at all  Meal 3: occasional skip or chicken with vegetables or chicken stir lux or doshi  Snacks: chips or popcorn when stressed or fruit or ice cream after dinner  Beverages: water ~4-6 8oz glasses, coffee in the morning, diet coke (stopped  since gout)    Allergies: None  Intolerance: None  Appetite: Fluctuates  Intake: 50-75%  GI Symptoms : constipationintermittent  Swallowing Difficulty: No problems with swallowing  Dentition : own    Types of Activities: Bike Riding pt states with colder weather she has not been as consistent with exercise - discussed possibly purchasing stationary bike  Duration: 30-60 minutes every 2 days    Sleep duration/quality : 5-6 hours and continuous sleep  Sleep disorders: none    Supplements: Denies     Food Preparation: Patient and Family  Cooking Skills/Barriers: None reported  Grocery Shopping: Patient and Family    Eating Out Type: Denies/Unknown       Nutrition Focused Physical Exam:    Performed/Deferred: Deferred as pt visually appears well-nourished with no signs of malnutrition    Estimated Energy Needs:  Weight Loss Needs: ~1700 kcal/day and MSJ: with sedentary lifestyle -500 for wt loss    Nutrition Diagnosis:    Diagnosis Statement 1:  Diagnosis Status: New  Diagnosis : Obese related to  excess caloric intake  as evidenced by  pt interview and diet recall (currently on Mounjaro for wt loss, BMI of 41    Nutrition Interventions:  Decreased Carbohydrate Diet, Increased Fiber Diet, and Increased Protein Diet  Nutrition Counseling: Motivational Interviewing  Coordination of Care: None  Instructed on counting macronutrient intake, proper portion sizes, label reading and general healthful nutrition. Instructed on benefits of wt loss with diabetes and heart health. Discussed mindful eating, slow gradual wt loss and goals beyond wt. Instructed pt to not skip meals - discussed this may lead to over eating later or snacking more than planned. Instructed on importance of physical activity for wt loss and maintenance of wt loss. Instructed on low purine diet to help with gout symptoms and prevent additional flare ups. Both verbal and written education provided in the one-on-one setting. Pt verbalized understanding of  information provided. All questions answered to pt satisfaction throughout visit   Nutrition Goals:  Nutrition Goals : Adequate fluid intake: ~64 oz water daily  Consistent meal/snack pattern  Decrease intake of added sugars  Initiate Exercise Regimen  Reduce Kcal Intake  Weight Loss    Nutrition Recommendations:  Via teach back method patient verbalized understanding of the following topics:  1) Follow plate method when planning meals (1/2 plate non-starchy vegetables, 1/4 plate lean protein, 1/4 plate carbohydrates).   2) Increase fiber from fruits, vegetables, whole grains, and beans/lentils  3) Choose lean protein sources including chicken, turkey, seafood, and eggs.   4) Aim to include more plant-based sources of protein including tofu, beans, lentils, nuts, nut butters, and pea protein  5) aim for at least 3 meals per day - be sure to include protein with each meal and snack  6) Pt to initiate exercise regimen/plan for winter exercise    Educational Handouts:   wt loss tips, 1700 calorie 5 day meal plan, choose your foods list, exercise, label reading, plate method, low purine diet    Readiness to Change : Good  Level of Understanding : Good  Anticipated Compliant : Good

## 2023-11-02 NOTE — LETTER
November 2, 2023     William Nieto MD  [address]  [facility], [place] [id number]    Patient: Brinda Espinosa   YOB: 1975   Date of Visit: 11/2/2023       Dear Dr. William Nieto MD:    Thank you for referring Brinda Espinosa to me for evaluation. Below are my notes for this consultation.  If you have questions, please do not hesitate to call me. I look forward to following your patient along with you.       Sincerely,     Tiera Kent, ISABEL, LD      CC: No Recipients  ______________________________________________________________________________________    Reason for Nutrition Visit:  Pt is a 48 y.o. female referred for   1. Acute gout involving toe of right foot, unspecified cause  Referral to Nutrition Services      2. Morbid obesity with BMI of 40.0-44.9, adult (CMS/[facility])  Referral to Nutrition Services           Past Medical Hx:  Patient Active Problem List   Diagnosis   • Prediabetes   • Anxiety   • Migraines   • Morbid obesity with BMI of 40.0-44.9, adult (CMS/[facility])   • Ovarian tumor of borderline malignancy, right   • Pes planus   • Status post total left knee replacement   • Dysfunction of both eustachian tubes   • Vitamin D deficiency   • Persistent insomnia   • Mixed hyperlipidemia   • Primary osteoarthritis of right knee   • Gouty arthritis of great toe   • Hypokalemia   • Edema   • Hair loss        Weight change:    Significant Weight Change: No  Pt reports 50 lb intentional wt loss in the past year    Lab Results   Component Value Date    HGBA1C 5.6 08/15/2023    HGBA1C 6.0 (H) 06/30/2021    CHOL 225 (H) 08/15/2023    LDLF 162 (H) 08/15/2023    TRIG 89 08/15/2023        Food and Nutrition Hx:  On Mounjaro for 1 year - is stopping due to cost (currently unemployed), lost ~55 lbs in the past year. Can get Wegovy free until the end of the year so may be switching to that for the rest of the year. Pt reports reccent flare of gout - on medication   Trying to eat fish  1x/week  Does not typically eat after dinner  Has not been tracking intake but states was was in the past  Stress affects appetite - has been under stress recently and has not felt like eating  With Mounjaro eating 1 meal/day and usually something smaller later    24 Diet Recall:  Meal 1: oatmeal or fiber bar  Meal 2: yogurt or lunch meat sandwich or time when she is not hungry at all  Meal 3: occasional skip or chicken with vegetables or chicken stir lux or doshi  Snacks: chips or popcorn when stressed or fruit or ice cream after dinner  Beverages: water ~4-6 8oz glasses, coffee in the morning, diet coke (stopped since gout)    Allergies: None  Intolerance: None  Appetite: Fluctuates  Intake: 50-75%  GI Symptoms : constipationintermittent  Swallowing Difficulty: No problems with swallowing  Dentition : own    Types of Activities: Bike Riding pt states with colder weather she has not been as consistent with exercise - discussed possibly purchasing stationary bike  Duration: 30-60 minutes every 2 days    Sleep duration/quality : 5-6 hours and continuous sleep  Sleep disorders: none    Supplements: Denies     Food Preparation: Patient and Family  Cooking Skills/Barriers: None reported  Grocery Shopping: Patient and Family    Eating Out Type: Denies/Unknown       Nutrition Focused Physical Exam:    Performed/Deferred: Deferred as pt visually appears well-nourished with no signs of malnutrition    Estimated Energy Needs:  Weight Loss Needs: ~1700 kcal/day and MSJ: with sedentary lifestyle -500 for wt loss    Nutrition Diagnosis:    Diagnosis Statement 1:  Diagnosis Status: New  Diagnosis : Obese related to  excess caloric intake  as evidenced by  pt interview and diet recall (currently on Mounjaro for wt loss, BMI of 41    Nutrition Interventions:  Decreased Carbohydrate Diet, Increased Fiber Diet, and Increased Protein Diet  Nutrition Counseling: Motivational Interviewing  Coordination of Care: None  Instructed on  counting macronutrient intake, proper portion sizes, label reading and general healthful nutrition. Instructed on benefits of wt loss with diabetes and heart health. Discussed mindful eating, slow gradual wt loss and goals beyond wt. Instructed pt to not skip meals - discussed this may lead to over eating later or snacking more than planned. Instructed on importance of physical activity for wt loss and maintenance of wt loss. Instructed on low purine diet to help with gout symptoms and prevent additional flare ups. Both verbal and written education provided in the one-on-one setting. Pt verbalized understanding of information provided. All questions answered to pt satisfaction throughout visit   Nutrition Goals:  Nutrition Goals : Adequate fluid intake: ~64 oz water daily  Consistent meal/snack pattern  Decrease intake of added sugars  Initiate Exercise Regimen  Reduce Kcal Intake  Weight Loss    Nutrition Recommendations:  Via teach back method patient verbalized understanding of the following topics:  1) Follow plate method when planning meals (1/2 plate non-starchy vegetables, 1/4 plate lean protein, 1/4 plate carbohydrates).   2) Increase fiber from fruits, vegetables, whole grains, and beans/lentils  3) Choose lean protein sources including chicken, turkey, seafood, and eggs.   4) Aim to include more plant-based sources of protein including tofu, beans, lentils, nuts, nut butters, and pea protein  5) aim for at least 3 meals per day - be sure to include protein with each meal and snack  6) Pt to initiate exercise regimen/plan for winter exercise    Educational Handouts:   wt loss tips, 1700 calorie 5 day meal plan, choose your foods list, exercise, label reading, plate method, low purine diet    Readiness to Change : Good  Level of Understanding : Good  Anticipated Compliant : Good

## 2023-11-10 ENCOUNTER — TELEPHONE (OUTPATIENT)
Dept: PRIMARY CARE | Facility: CLINIC | Age: 48
End: 2023-11-10

## 2023-11-10 DIAGNOSIS — E66.01 MORBID OBESITY WITH BMI OF 40.0-44.9, ADULT (MULTI): ICD-10-CM

## 2023-11-10 DIAGNOSIS — R21 RASH AND NONSPECIFIC SKIN ERUPTION: Primary | ICD-10-CM

## 2023-11-10 DIAGNOSIS — R73.03 PREDIABETES: ICD-10-CM

## 2023-11-10 RX ORDER — TIRZEPATIDE 15 MG/.5ML
15 INJECTION, SOLUTION SUBCUTANEOUS
Qty: 2 ML | Refills: 1 | Status: SHIPPED | OUTPATIENT
Start: 2023-11-10 | End: 2023-11-28 | Stop reason: ALTCHOICE

## 2023-11-10 RX ORDER — VALACYCLOVIR HYDROCHLORIDE 1 G/1
1000 TABLET, FILM COATED ORAL EVERY 24 HOURS
Qty: 30 TABLET | Refills: 0 | Status: SHIPPED | OUTPATIENT
Start: 2023-11-10 | End: 2023-11-28 | Stop reason: SDUPTHER

## 2023-11-15 ENCOUNTER — APPOINTMENT (OUTPATIENT)
Dept: PRIMARY CARE | Facility: CLINIC | Age: 48
End: 2023-11-15

## 2023-11-28 ENCOUNTER — OFFICE VISIT (OUTPATIENT)
Dept: PRIMARY CARE | Facility: CLINIC | Age: 48
End: 2023-11-28
Payer: COMMERCIAL

## 2023-11-28 VITALS
DIASTOLIC BLOOD PRESSURE: 80 MMHG | WEIGHT: 258 LBS | HEART RATE: 72 BPM | BODY MASS INDEX: 41.64 KG/M2 | OXYGEN SATURATION: 98 % | SYSTOLIC BLOOD PRESSURE: 124 MMHG

## 2023-11-28 DIAGNOSIS — F41.9 ANXIETY: ICD-10-CM

## 2023-11-28 DIAGNOSIS — G47.00 INSOMNIA, UNSPECIFIED TYPE: ICD-10-CM

## 2023-11-28 DIAGNOSIS — R73.03 PREDIABETES: ICD-10-CM

## 2023-11-28 DIAGNOSIS — R60.0 LOCALIZED EDEMA: ICD-10-CM

## 2023-11-28 DIAGNOSIS — R21 RASH AND NONSPECIFIC SKIN ERUPTION: ICD-10-CM

## 2023-11-28 DIAGNOSIS — B00.1 RECURRENT COLD SORES: Primary | ICD-10-CM

## 2023-11-28 DIAGNOSIS — M10.9 GOUTY ARTHRITIS OF GREAT TOE: ICD-10-CM

## 2023-11-28 DIAGNOSIS — G47.00 PERSISTENT INSOMNIA: ICD-10-CM

## 2023-11-28 DIAGNOSIS — L65.9 HAIR LOSS: ICD-10-CM

## 2023-11-28 DIAGNOSIS — D39.11 OVARIAN TUMOR OF BORDERLINE MALIGNANCY, RIGHT: ICD-10-CM

## 2023-11-28 DIAGNOSIS — M17.11 PRIMARY OSTEOARTHRITIS OF RIGHT KNEE: ICD-10-CM

## 2023-11-28 DIAGNOSIS — E87.6 HYPOKALEMIA: ICD-10-CM

## 2023-11-28 DIAGNOSIS — Z13.6 ISCHEMIC HEART DISEASE SCREEN: ICD-10-CM

## 2023-11-28 DIAGNOSIS — E55.9 VITAMIN D DEFICIENCY: ICD-10-CM

## 2023-11-28 DIAGNOSIS — G43.009 MIGRAINE WITHOUT AURA AND WITHOUT STATUS MIGRAINOSUS, NOT INTRACTABLE: ICD-10-CM

## 2023-11-28 DIAGNOSIS — E66.01 MORBID OBESITY WITH BMI OF 40.0-44.9, ADULT (MULTI): ICD-10-CM

## 2023-11-28 PROCEDURE — 3008F BODY MASS INDEX DOCD: CPT | Performed by: FAMILY MEDICINE

## 2023-11-28 PROCEDURE — 99214 OFFICE O/P EST MOD 30 MIN: CPT | Performed by: FAMILY MEDICINE

## 2023-11-28 PROCEDURE — 1036F TOBACCO NON-USER: CPT | Performed by: FAMILY MEDICINE

## 2023-11-28 RX ORDER — ESZOPICLONE 3 MG/1
3 TABLET, FILM COATED ORAL NIGHTLY
Qty: 30 TABLET | Refills: 0 | Status: SHIPPED | OUTPATIENT
Start: 2023-11-28 | End: 2024-01-08 | Stop reason: SDUPTHER

## 2023-11-28 RX ORDER — COLCHICINE 0.6 MG/1
0.6 TABLET ORAL 3 TIMES DAILY PRN
COMMUNITY
Start: 2023-11-19

## 2023-11-28 RX ORDER — SPIRONOLACTONE 25 MG/1
25 TABLET ORAL DAILY
Qty: 90 TABLET | Refills: 3 | Status: SHIPPED | OUTPATIENT
Start: 2023-11-28 | End: 2024-11-27

## 2023-11-28 RX ORDER — ESTRADIOL 0.05 MG/D
1 PATCH TRANSDERMAL
COMMUNITY

## 2023-11-28 RX ORDER — SEMAGLUTIDE 2.4 MG/.75ML
2.4 INJECTION, SOLUTION SUBCUTANEOUS
Qty: 9 ML | Refills: 3 | Status: SHIPPED | OUTPATIENT
Start: 2023-11-28 | End: 2024-04-01 | Stop reason: WASHOUT

## 2023-11-28 RX ORDER — VALACYCLOVIR HYDROCHLORIDE 1 G/1
TABLET, FILM COATED ORAL
Qty: 28 TABLET | Refills: 0 | Status: SHIPPED | OUTPATIENT
Start: 2023-11-28 | End: 2024-03-07 | Stop reason: SDUPTHER

## 2023-11-28 RX ORDER — SUMATRIPTAN SUCCINATE 100 MG/1
100 TABLET ORAL ONCE AS NEEDED
Qty: 27 TABLET | Refills: 0 | Status: SHIPPED | OUTPATIENT
Start: 2023-11-28 | End: 2024-03-07 | Stop reason: SDUPTHER

## 2023-11-28 NOTE — PROGRESS NOTES
Subjective   Patient ID: Brinda Espinosa is a 48 y.o. female who presents for Med Management (Discuss recent gout attacks, thirsty all the time).    HPI   Anxiety  - had been doing well but still  laid off. Her mothers health issues with memory raise the stress.   She has been to counseling a few times with [facility] in [place].      Insomnia - on Lunesta not every day. 1/2 to 1 of them.   CSA 5/15/23  UDS 5/15/23 as expected for medication profile OARRS is consistent with that.      Obesity and Prediabetes  - She is on Mounjaro 15 and had lost 58 pounds but now up a bit as Mounjaro not covered and on steroids for gout and then had Thanksgiving meal.  Last A1C was 5.9% in February down from 6.3%.  No Chest pain, Dyspnea, palpitations, numbness, weakness, edema, claudication, or double vision/ loss of vision. Occasional racing heart with stress. Wygovy would be covered till end of the year. So may switch to that. 1.7 mg.      OA of the right knee and left TKR - still mild pain on left and more on right. No meds now except on occasion. Weight loss did help.      Hyperlipidemia - no recent checks. No meds.  225/45/162 in August. Encourage more fruits and vegetables. FMH of CAD not clear as mom negative, father  of cancer early, grandparents with heart attack.  Non smoker      ETD right more than left most of time.       Ovarian tumor borderline or precancerous. That has been removed. Gynecology follows.. Tumor markers negative in August.      Hair loss - on Dutasteride. Wonders if worse with Mounjaro and weight loss.     Headaches - Migraines not as frequent. Throbbing, photophobia, and nausea. Better with hydration and limiting caffeine. Brand name Imitrex helps. Dry mouth all the time.      Edema at times - she is on hydrochlorothiazide.  She is wondering about another due to gout.     Uric acid is 9.1.  She is steroids at times. She has not been on Allopurinol. Will try Aldactone to increase potassium, lower uric  compared to hydrochlorothiazide and keep edema away.      Menopause on Hormone replacement. Aggravated by ovary removal. Dr Garcia.      Rash on right shin for months. Pruritic. Better with steroid cream and better.     Recurrent cold sores - Every year or so. At the end of the year she got 90 day supply and they  and did not work this time. She would like to try the brand this time      Cologuard declined at present     Review of Systems    Objective   /80 (BP Location: Right arm, Patient Position: Sitting)   Pulse 72   Wt 117 kg (258 lb)   SpO2 98%   BMI 41.64 kg/m²     Physical Exam  Vitals reviewed.   Constitutional:       Appearance: Normal appearance.   HENT:      Head: Normocephalic.   Eyes:      Extraocular Movements: Extraocular movements intact.      Conjunctiva/sclera: Conjunctivae normal.      Pupils: Pupils are equal, round, and reactive to light.   Neck:      Vascular: No carotid bruit.   Cardiovascular:      Rate and Rhythm: Normal rate and regular rhythm.      Heart sounds: No murmur heard.  Pulmonary:      Effort: Pulmonary effort is normal.      Breath sounds: Normal breath sounds.   Abdominal:      General: Abdomen is flat. Bowel sounds are normal.      Palpations: Abdomen is soft.   Musculoskeletal:      Cervical back: Normal range of motion and neck supple. No tenderness.   Lymphadenopathy:      Cervical: No cervical adenopathy.   Skin:     General: Skin is warm and dry.   Neurological:      Mental Status: She is alert and oriented to person, place, and time.   Psychiatric:         Mood and Affect: Mood normal.         Behavior: Behavior normal.         Thought Content: Thought content normal.         Judgment: Judgment normal.         Assessment/Plan   Diagnoses and all orders for this visit:  Recurrent cold sores  Prediabetes  -     Follow Up In Primary Care - Established  -     semaglutide, weight loss, (Wegovy) 2.4 mg/0.75 mL pen injector; Inject 2.4 mg under the skin  every 7 days.  -     Comprehensive Metabolic Panel; Future  -     Hemoglobin A1C; Future  Migraine without aura and without status migrainosus, not intractable  -     SUMAtriptan (Imitrex) 100 mg tablet; Take 1 tablet (100 mg) by mouth 1 time if needed for migraine.  Insomnia, unspecified type  -     eszopiclone (Lunesta) 3 mg tablet; Take 1 tablet (3 mg) by mouth once daily at bedtime. Take immediately before bedtime  Rash and nonspecific skin eruption  -     valACYclovir (Valtrex) 1 gram tablet; Take 2 tablets at onset and then repeat in 12 hours.  Ischemic heart disease screen  -     CT cardiac scoring wo IV contrast; Future  Localized edema  -     spironolactone (Aldactone) 25 mg tablet; Take 1 tablet (25 mg) by mouth once daily.  -     Basic Metabolic Panel; Future  Gouty arthritis of great toe  -     Uric Acid; Future  -     CBC; Future  Morbid obesity with BMI of 40.0-44.9, adult (CMS/HCC)  -     semaglutide, weight loss, (Wegovy) 2.4 mg/0.75 mL pen injector; Inject 2.4 mg under the skin every 7 days.  Ovarian tumor of borderline malignancy, right  Primary osteoarthritis of right knee  Persistent insomnia  Hypokalemia  Hair loss  Anxiety  Vitamin D deficiency

## 2023-12-11 ENCOUNTER — TELEPHONE (OUTPATIENT)
Dept: PRIMARY CARE | Facility: CLINIC | Age: 48
End: 2023-12-11

## 2023-12-11 DIAGNOSIS — U07.1 COVID-19: Primary | ICD-10-CM

## 2023-12-11 NOTE — TELEPHONE ENCOUNTER
PT TESTED + FOR COVID ([facility]) AND WOULD LIKE TO HAVE A PRESCRIPTION FOR paxlovid SENT OVER TO [facility]

## 2023-12-11 NOTE — TELEPHONE ENCOUNTER
Patient called and said that she called back to let know symptoms. She said she is congested, tired, has body aches. But she wants to take the Paxlovid so she does her Pneumonia. If there are any other questions, please call.   Thank you

## 2024-01-08 ENCOUNTER — TELEPHONE (OUTPATIENT)
Dept: PRIMARY CARE | Facility: CLINIC | Age: 49
End: 2024-01-08

## 2024-01-08 DIAGNOSIS — G47.00 INSOMNIA, UNSPECIFIED TYPE: ICD-10-CM

## 2024-01-08 RX ORDER — ESZOPICLONE 3 MG/1
3 TABLET, FILM COATED ORAL NIGHTLY
Qty: 30 TABLET | Refills: 0 | Status: SHIPPED | OUTPATIENT
Start: 2024-01-08 | End: 2024-02-19 | Stop reason: SDUPTHER

## 2024-02-19 ENCOUNTER — TELEPHONE (OUTPATIENT)
Dept: PRIMARY CARE | Facility: CLINIC | Age: 49
End: 2024-02-19
Payer: COMMERCIAL

## 2024-02-19 DIAGNOSIS — G47.00 INSOMNIA, UNSPECIFIED TYPE: ICD-10-CM

## 2024-02-19 RX ORDER — ESZOPICLONE 3 MG/1
3 TABLET, FILM COATED ORAL NIGHTLY
Qty: 30 TABLET | Refills: 0 | Status: SHIPPED | OUTPATIENT
Start: 2024-02-19 | End: 2024-04-01 | Stop reason: SDUPTHER

## 2024-03-06 NOTE — TELEPHONE ENCOUNTER
REFILL FOR LUNESTA AND MOUNJARO NEEDED. NO PHARMACY LEFT WITH MESSAGE   RN called [facility] and gave report to nurse Katherine.

## 2024-03-07 ENCOUNTER — TELEPHONE (OUTPATIENT)
Dept: PRIMARY CARE | Facility: CLINIC | Age: 49
End: 2024-03-07
Payer: COMMERCIAL

## 2024-03-07 DIAGNOSIS — G43.009 MIGRAINE WITHOUT AURA AND WITHOUT STATUS MIGRAINOSUS, NOT INTRACTABLE: ICD-10-CM

## 2024-03-07 DIAGNOSIS — R21 RASH AND NONSPECIFIC SKIN ERUPTION: ICD-10-CM

## 2024-03-07 RX ORDER — VALACYCLOVIR HYDROCHLORIDE 1 G/1
TABLET, FILM COATED ORAL
Qty: 28 TABLET | Refills: 0 | Status: SHIPPED | OUTPATIENT
Start: 2024-03-07 | End: 2024-03-08 | Stop reason: SDUPTHER

## 2024-03-07 RX ORDER — SUMATRIPTAN SUCCINATE 100 MG/1
100 TABLET ORAL ONCE AS NEEDED
Qty: 27 TABLET | Refills: 0 | Status: SHIPPED | OUTPATIENT
Start: 2024-03-07 | End: 2024-04-01 | Stop reason: SDUPTHER

## 2024-03-07 NOTE — TELEPHONE ENCOUNTER
Considering taking a medication again (couldn't understand what one) and wanting to know if she needs an appointment. Also asking for refill of valacyclovir sent to  pharmacy.

## 2024-03-07 NOTE — TELEPHONE ENCOUNTER
Asking if Dr. Nieto would be able to see her for a medication follow up either 3/11 before 4PM or 3/13 AM. Asking for a call back.

## 2024-03-08 DIAGNOSIS — R21 RASH AND NONSPECIFIC SKIN ERUPTION: ICD-10-CM

## 2024-03-08 RX ORDER — VALACYCLOVIR HYDROCHLORIDE 1 G/1
TABLET, FILM COATED ORAL
Qty: 28 TABLET | Refills: 0 | Status: SHIPPED | OUTPATIENT
Start: 2024-03-08 | End: 2024-05-10 | Stop reason: SDUPTHER

## 2024-04-01 ENCOUNTER — LAB (OUTPATIENT)
Dept: LAB | Facility: LAB | Age: 49
End: 2024-04-01
Payer: COMMERCIAL

## 2024-04-01 ENCOUNTER — OFFICE VISIT (OUTPATIENT)
Dept: PRIMARY CARE | Facility: CLINIC | Age: 49
End: 2024-04-01
Payer: COMMERCIAL

## 2024-04-01 VITALS
HEART RATE: 81 BPM | BODY MASS INDEX: 41.95 KG/M2 | HEIGHT: 66 IN | WEIGHT: 261 LBS | DIASTOLIC BLOOD PRESSURE: 78 MMHG | OXYGEN SATURATION: 95 % | SYSTOLIC BLOOD PRESSURE: 126 MMHG

## 2024-04-01 DIAGNOSIS — E55.9 VITAMIN D DEFICIENCY: ICD-10-CM

## 2024-04-01 DIAGNOSIS — Z96.652 STATUS POST TOTAL LEFT KNEE REPLACEMENT: ICD-10-CM

## 2024-04-01 DIAGNOSIS — E87.6 HYPOKALEMIA: ICD-10-CM

## 2024-04-01 DIAGNOSIS — M10.9 GOUTY ARTHRITIS OF GREAT TOE: ICD-10-CM

## 2024-04-01 DIAGNOSIS — H69.93 DYSFUNCTION OF BOTH EUSTACHIAN TUBES: ICD-10-CM

## 2024-04-01 DIAGNOSIS — G47.00 INSOMNIA, UNSPECIFIED TYPE: ICD-10-CM

## 2024-04-01 DIAGNOSIS — M17.11 PRIMARY OSTEOARTHRITIS OF RIGHT KNEE: ICD-10-CM

## 2024-04-01 DIAGNOSIS — B00.1 RECURRENT COLD SORES: ICD-10-CM

## 2024-04-01 DIAGNOSIS — E78.2 MIXED HYPERLIPIDEMIA: ICD-10-CM

## 2024-04-01 DIAGNOSIS — G43.009 MIGRAINE WITHOUT AURA AND WITHOUT STATUS MIGRAINOSUS, NOT INTRACTABLE: ICD-10-CM

## 2024-04-01 DIAGNOSIS — G47.00 PERSISTENT INSOMNIA: ICD-10-CM

## 2024-04-01 DIAGNOSIS — R60.0 LOCALIZED EDEMA: ICD-10-CM

## 2024-04-01 DIAGNOSIS — R73.03 PREDIABETES: ICD-10-CM

## 2024-04-01 DIAGNOSIS — E66.01 MORBID OBESITY WITH BMI OF 40.0-44.9, ADULT (MULTI): ICD-10-CM

## 2024-04-01 DIAGNOSIS — I89.0 LYMPHEDEMA: ICD-10-CM

## 2024-04-01 DIAGNOSIS — L65.9 HAIR LOSS: ICD-10-CM

## 2024-04-01 DIAGNOSIS — F41.9 ANXIETY: Primary | ICD-10-CM

## 2024-04-01 DIAGNOSIS — D39.11 OVARIAN TUMOR OF BORDERLINE MALIGNANCY, RIGHT: ICD-10-CM

## 2024-04-01 PROBLEM — R60.9 EDEMA: Status: RESOLVED | Noted: 2023-11-01 | Resolved: 2024-04-01

## 2024-04-01 LAB
ALBUMIN SERPL BCP-MCNC: 4.3 G/DL (ref 3.4–5)
ALP SERPL-CCNC: 64 U/L (ref 33–110)
ALT SERPL W P-5'-P-CCNC: 15 U/L (ref 7–45)
ANION GAP SERPL CALC-SCNC: 15 MMOL/L (ref 10–20)
AST SERPL W P-5'-P-CCNC: 16 U/L (ref 9–39)
BILIRUB SERPL-MCNC: 0.5 MG/DL (ref 0–1.2)
BUN SERPL-MCNC: 14 MG/DL (ref 6–23)
CALCIUM SERPL-MCNC: 9.6 MG/DL (ref 8.6–10.3)
CHLORIDE SERPL-SCNC: 105 MMOL/L (ref 98–107)
CO2 SERPL-SCNC: 25 MMOL/L (ref 21–32)
CREAT SERPL-MCNC: 0.7 MG/DL (ref 0.5–1.05)
EGFRCR SERPLBLD CKD-EPI 2021: >90 ML/MIN/1.73M*2
ERYTHROCYTE [DISTWIDTH] IN BLOOD BY AUTOMATED COUNT: 12.8 % (ref 11.5–14.5)
EST. AVERAGE GLUCOSE BLD GHB EST-MCNC: 114 MG/DL
GLUCOSE SERPL-MCNC: 97 MG/DL (ref 74–99)
HBA1C MFR BLD: 5.6 %
HCT VFR BLD AUTO: 46.2 % (ref 36–46)
HGB BLD-MCNC: 14.7 G/DL (ref 12–16)
MCH RBC QN AUTO: 30.4 PG (ref 26–34)
MCHC RBC AUTO-ENTMCNC: 31.8 G/DL (ref 32–36)
MCV RBC AUTO: 96 FL (ref 80–100)
NRBC BLD-RTO: 0 /100 WBCS (ref 0–0)
PLATELET # BLD AUTO: 324 X10*3/UL (ref 150–450)
POTASSIUM SERPL-SCNC: 4.7 MMOL/L (ref 3.5–5.3)
PROT SERPL-MCNC: 7 G/DL (ref 6.4–8.2)
RBC # BLD AUTO: 4.83 X10*6/UL (ref 4–5.2)
SODIUM SERPL-SCNC: 140 MMOL/L (ref 136–145)
URATE SERPL-MCNC: 6.4 MG/DL (ref 2.3–6.7)
WBC # BLD AUTO: 7 X10*3/UL (ref 4.4–11.3)

## 2024-04-01 PROCEDURE — 1036F TOBACCO NON-USER: CPT | Performed by: FAMILY MEDICINE

## 2024-04-01 PROCEDURE — 3008F BODY MASS INDEX DOCD: CPT | Performed by: FAMILY MEDICINE

## 2024-04-01 PROCEDURE — 80053 COMPREHEN METABOLIC PANEL: CPT

## 2024-04-01 PROCEDURE — 82652 VIT D 1 25-DIHYDROXY: CPT

## 2024-04-01 PROCEDURE — 99214 OFFICE O/P EST MOD 30 MIN: CPT | Performed by: FAMILY MEDICINE

## 2024-04-01 PROCEDURE — 84550 ASSAY OF BLOOD/URIC ACID: CPT

## 2024-04-01 PROCEDURE — 83036 HEMOGLOBIN GLYCOSYLATED A1C: CPT

## 2024-04-01 PROCEDURE — 85027 COMPLETE CBC AUTOMATED: CPT

## 2024-04-01 PROCEDURE — 36415 COLL VENOUS BLD VENIPUNCTURE: CPT

## 2024-04-01 RX ORDER — CHOLECALCIFEROL (VITAMIN D3) 1250 MCG
50000 TABLET ORAL
Qty: 12 TABLET | Refills: 3 | Status: SHIPPED | OUTPATIENT
Start: 2024-04-01 | End: 2025-04-01

## 2024-04-01 RX ORDER — SUMATRIPTAN SUCCINATE 100 MG/1
100 TABLET ORAL ONCE AS NEEDED
Qty: 27 TABLET | Refills: 0 | Status: SHIPPED | OUTPATIENT
Start: 2024-04-01 | End: 2024-04-12 | Stop reason: SDUPTHER

## 2024-04-01 RX ORDER — ESZOPICLONE 3 MG/1
3 TABLET, FILM COATED ORAL NIGHTLY
Qty: 30 TABLET | Refills: 0 | Status: SHIPPED | OUTPATIENT
Start: 2024-04-01 | End: 2024-05-30 | Stop reason: SDUPTHER

## 2024-04-01 NOTE — PROGRESS NOTES
Subjective   Patient ID: Brinda Espinosa is a 48 y.o. female who presents for evaluate (Discuss getting on Zepbound for weight loss/).    HPI   Anxiety  - had been doing well and has a new job that is OK. Her mothers health issues with memory raise the stress.   She has been to counseling a few times with [facility] in [place]. No recent visits .     Insomnia - on Lunesta not every day. 1/2 to 1 of them.   CSA 5/15/23  UDS 5/15/23 as expected for medication profile OARRS is consistent with that.      Obesity and Prediabetes  - She was on Mounjaro 15 when coupon available.  So off since the . And had lost 44 pounds and has kept it down for the last 6 months.     Last A1C was 5.6% in August.  No Chest pain, Dyspnea, palpitations, numbness, weakness, edema, claudication, or double vision/ loss of vision. Occasional racing heart with stress. Interested in trying Zepbound.    Since she did well with Mounjaro will go ahead and start at 5.     OA of the right knee and left TKR - still mild pain on left and more on right. No meds now except on occasion. Weight loss did help.      Hyperlipidemia - no recent checks. No meds.  225/45/162 in 2023. . Encourage more fruits and vegetables. FMH of CAD not clear as mom negative, father  of cancer early, grandparents with heart attack.  Non smoker      ETD right more than left most of time.  Dry nasal mucosa      Ovarian tumor borderline or precancerous. That has been removed. Gynecology follows.. Tumor markers negative in March.  US with a spot in the uterus      Hair loss - on Dutasteride from dermatology      Headaches - Migraines not as frequent. Throbbing, photophobia, and nausea. Better with hydration and limiting caffeine. Brand name Imitrex helps. Dry mouth all the time.      Edema at times - She is on spironolactone now.     Uric acid is 9.1.  A few spells. She is steroids at times. She has not been on Allopurinol. Was on Hydrochlorothiazide.  Now on Aldactone to  "increase potassium, lower uric compared to hydrochlorothiazide and keep edema away.      Menopause on Hormone replacement. Aggravated by ovary removal.      Recurrent cold sores - Every year or so. At the end of the year she got 90 day supply and they  and did not work this time. She would like to try the brand but not available      Cologuard declined at present       Review of Systems    Objective   /78 (BP Location: Left arm, Patient Position: Sitting)   Pulse 81   Ht 1.676 m (5' 6\")   Wt 118 kg (261 lb)   SpO2 95%   BMI 42.13 kg/m²     Physical Exam  Vitals reviewed.   Constitutional:       General: She is not in acute distress.     Appearance: Normal appearance. She is obese.   HENT:      Head: Normocephalic.      Right Ear: Tympanic membrane, ear canal and external ear normal.      Left Ear: Tympanic membrane, ear canal and external ear normal.      Nose: Nose normal.      Comments: Dry mucosa     Mouth/Throat:      Mouth: Mucous membranes are moist.      Pharynx: Oropharynx is clear.   Eyes:      Extraocular Movements: Extraocular movements intact.      Conjunctiva/sclera: Conjunctivae normal.      Pupils: Pupils are equal, round, and reactive to light.   Neck:      Vascular: No carotid bruit.   Cardiovascular:      Rate and Rhythm: Normal rate and regular rhythm.      Pulses: Normal pulses.      Heart sounds: Normal heart sounds. No murmur heard.  Pulmonary:      Effort: Pulmonary effort is normal. No respiratory distress.      Breath sounds: Normal breath sounds.   Abdominal:      General: Abdomen is flat. Bowel sounds are normal. There is no distension.      Palpations: Abdomen is soft. There is no mass.      Tenderness: There is no abdominal tenderness.   Musculoskeletal:      Cervical back: Normal range of motion and neck supple. No tenderness.   Lymphadenopathy:      Cervical: No cervical adenopathy.   Skin:     General: Skin is warm and dry.      Findings: No rash.   Neurological:     "  General: No focal deficit present.      Mental Status: She is alert and oriented to person, place, and time.   Psychiatric:         Mood and Affect: Mood normal.         Thought Content: Thought content normal.         Judgment: Judgment normal.         Assessment/Plan   Diagnoses and all orders for this visit:  Anxiety  Vitamin D deficiency  -     cholecalciferol (Vitamin D3) 1,250 mcg (50,000 unit) tablet; Take 1 tablet (50,000 Units) by mouth 1 (one) time per week.  Insomnia, unspecified type  -     eszopiclone (Lunesta) 3 mg tablet; Take 1 tablet (3 mg) by mouth once daily at bedtime. Take immediately before bedtime  Migraine without aura and without status migrainosus, not intractable  -     SUMAtriptan (Imitrex) 100 mg tablet; Take 1 tablet (100 mg) by mouth 1 time if needed for migraine.  Dysfunction of both eustachian tubes  Gouty arthritis of great toe  -     Uric Acid; Future  Hypokalemia  Hair loss  Lymphedema  Mixed hyperlipidemia  Morbid obesity with BMI of 40.0-44.9, adult (CMS/[facility])  -     tirzepatide, weight loss, (Zepbound) 5 mg/0.5 mL injection; Inject 5 mg under the skin every 7 days.  -     CBC; Future  -     Follow Up In Primary Care - Established; Future  Ovarian tumor of borderline malignancy, right  Persistent insomnia  Prediabetes  -     tirzepatide, weight loss, (Zepbound) 5 mg/0.5 mL injection; Inject 5 mg under the skin every 7 days.  -     Comprehensive Metabolic Panel; Future  -     Hemoglobin A1C; Future  -     Follow Up In Primary Care - Established; Future  Recurrent cold sores  Primary osteoarthritis of right knee  Status post total left knee replacement

## 2024-04-02 NOTE — RESULT ENCOUNTER NOTE
Let her know the labs look good. The A1c is not diabetic or even prediabetic. Blood cells, liver and kidney all look good

## 2024-04-04 LAB — 1,25(OH)2D3 SERPL-MCNC: 27.3 PG/ML (ref 19.9–79.3)

## 2024-04-05 ENCOUNTER — TELEPHONE (OUTPATIENT)
Dept: PRIMARY CARE | Facility: CLINIC | Age: 49
End: 2024-04-05
Payer: COMMERCIAL

## 2024-04-09 NOTE — TELEPHONE ENCOUNTER
Lmvm for pharmacist at  to call me with information regarding the titanium oxide in Imitrex vs Sumatriptan

## 2024-04-10 ENCOUNTER — TELEPHONE (OUTPATIENT)
Dept: PRIMARY CARE | Facility: CLINIC | Age: 49
End: 2024-04-10
Payer: COMMERCIAL

## 2024-04-10 NOTE — TELEPHONE ENCOUNTER
Pc from Paulina at [facility] pharmacy regarding the question on difference in ingredients between brand Imitrex and generic Sumatriptan.  Patient told me she was once told by a pharmacy (not sure which one) that brand Imitrex has Titanium dioxide in it and the Sumatriptan doesn't have any or as much and that's why the Sumatriptan doesn't work but Imitrex does.  Paulina faxed me ingredient for both meds.  I put that in your bin for review   DISPLAY PLAN FREE TEXT

## 2024-04-11 NOTE — TELEPHONE ENCOUNTER
Appeal letter faxed to [facility] [place]/[facility] for patient to get Imitrex.  Included the prior auth. Denial letter.

## 2024-04-12 ENCOUNTER — TELEPHONE (OUTPATIENT)
Dept: PRIMARY CARE | Facility: CLINIC | Age: 49
End: 2024-04-12
Payer: COMMERCIAL

## 2024-04-12 DIAGNOSIS — G43.009 MIGRAINE WITHOUT AURA AND WITHOUT STATUS MIGRAINOSUS, NOT INTRACTABLE: ICD-10-CM

## 2024-04-12 RX ORDER — SUMATRIPTAN SUCCINATE 100 MG/1
100 TABLET ORAL ONCE AS NEEDED
Qty: 27 TABLET | Refills: 0 | Status: SHIPPED | OUTPATIENT
Start: 2024-04-12 | End: 2024-05-10 | Stop reason: SDUPTHER

## 2024-04-12 NOTE — TELEPHONE ENCOUNTER
I called patient and let her know [facility] needs her Rx insurance coverage information.  I was told that [facility] doesn't have her on file to do the appeal, as it ended 1/31/24.  Patient tells me that [facility] is not in network and it shouldn't have been billed through [facility].  I told her it got sent to them because of the insurance info they had on file at [facility]. She said [facility] is not in network.  Will need to send Rx to [facility] for her Imitrex.  I told her to make sure they have the correct billing information for Rx's there.

## 2024-04-12 NOTE — TELEPHONE ENCOUNTER
There is not pharmacy insurance information on patient's insurance card. Can call with questions.

## 2024-05-10 ENCOUNTER — TELEPHONE (OUTPATIENT)
Dept: PRIMARY CARE | Facility: CLINIC | Age: 49
End: 2024-05-10
Payer: COMMERCIAL

## 2024-05-10 DIAGNOSIS — R21 RASH AND NONSPECIFIC SKIN ERUPTION: ICD-10-CM

## 2024-05-10 DIAGNOSIS — G43.009 MIGRAINE WITHOUT AURA AND WITHOUT STATUS MIGRAINOSUS, NOT INTRACTABLE: ICD-10-CM

## 2024-05-10 RX ORDER — VALACYCLOVIR HYDROCHLORIDE 1 G/1
TABLET, FILM COATED ORAL
Qty: 28 TABLET | Refills: 0 | Status: SHIPPED | OUTPATIENT
Start: 2024-05-10

## 2024-05-10 RX ORDER — SUMATRIPTAN SUCCINATE 100 MG/1
100 TABLET ORAL ONCE AS NEEDED
Qty: 27 TABLET | Refills: 0 | Status: SHIPPED | OUTPATIENT
Start: 2024-05-10

## 2024-05-10 NOTE — TELEPHONE ENCOUNTER
valACYclovir (Valtrex) 1 gram tablet [[id number]]    Order Details  Dose, Route, Frequency: As Directed   Dispense Quantity: 28 tablet Refills: 0    Note to Pharmacy: Please dispense brand as generic not effective last time.         Sig: Take 2 tablets at onset and then repeat in 12 hours.         Start Date: 03/08/24 End Date: --   Written Date: 03/08/24 Rx Expiration Date: 03/08/25        [facility]. GENERIC IS OK.    SUMAtriptan (Imitrex) 100 mg tablet [[id number]]    Order Details  Dose: 100 mg Route: oral Frequency: Once as needed for migraine   Dispense Quantity: 27 tablet Refills: 0    Note to Pharmacy: DISPENSE BRAND IMITREX         Sig: Take 1 tablet (100 mg) by mouth 1 time if needed for migraine.         Start Date: 04/12/24 End Date: --   Written Date: 04/12/24 Rx Expiration Date: 04/12/25    YOU WILL GET A NEW REQUEST PRIOR AUTHORIZATION, [facility]. THANK YOU.

## 2024-05-10 NOTE — TELEPHONE ENCOUNTER
SHE SAID SHE CAN'T GET THE NAME BRAND OF VALTEX UNTIL ITS APPROVED BY HER INSURANCE SHE WOULD LIKE A GENERIC BRAND SENT TO [facility]. HER PHONE NUMBER [phone]. THANK YOU.

## 2024-05-10 NOTE — TELEPHONE ENCOUNTER
[facility] ([facility])  left message that it's ok to dispense generic Valacyclovir this time until we get band approved with insurance

## 2024-05-30 ENCOUNTER — TELEPHONE (OUTPATIENT)
Dept: PRIMARY CARE | Facility: CLINIC | Age: 49
End: 2024-05-30
Payer: COMMERCIAL

## 2024-05-30 DIAGNOSIS — G47.00 INSOMNIA, UNSPECIFIED TYPE: ICD-10-CM

## 2024-05-30 RX ORDER — ESZOPICLONE 3 MG/1
3 TABLET, FILM COATED ORAL NIGHTLY
Qty: 30 TABLET | Refills: 0 | Status: SHIPPED | OUTPATIENT
Start: 2024-05-30 | End: 2024-06-29

## 2024-05-30 NOTE — TELEPHONE ENCOUNTER
Please send refill to [facility]  eszopiclone (Lunesta) 3 mg tablet

## 2024-05-30 NOTE — TELEPHONE ENCOUNTER
Lmvm for patient that for the cold it's just symptomatic treatment.  If she has a bad cough, can send in Robitussin w/codeine.  She is to let me know if that's something she needs and what pharmacy to send it to.

## 2024-05-30 NOTE — TELEPHONE ENCOUNTER
SHE CALLED TO ASK IF THERE IS ANYTHING YOU CAN GIVE HER FOR A REALLY BAD COLD, SHE TESTED NEGATIVE FOR COVID RAPID TEST. HER PHONE NUMBER [phone]. THANK YOU.

## 2024-06-03 ENCOUNTER — OFFICE VISIT (OUTPATIENT)
Dept: URGENT CARE | Facility: CLINIC | Age: 49
End: 2024-06-03
Payer: COMMERCIAL

## 2024-06-03 VITALS
OXYGEN SATURATION: 96 % | RESPIRATION RATE: 18 BRPM | HEART RATE: 91 BPM | WEIGHT: 261 LBS | TEMPERATURE: 97.7 F | DIASTOLIC BLOOD PRESSURE: 70 MMHG | SYSTOLIC BLOOD PRESSURE: 102 MMHG | BODY MASS INDEX: 42.13 KG/M2

## 2024-06-03 DIAGNOSIS — J01.00 ACUTE NON-RECURRENT MAXILLARY SINUSITIS: Primary | ICD-10-CM

## 2024-06-03 PROCEDURE — 99213 OFFICE O/P EST LOW 20 MIN: CPT | Performed by: NURSE PRACTITIONER

## 2024-06-03 RX ORDER — FLUCONAZOLE 150 MG/1
150 TABLET ORAL ONCE
Qty: 1 TABLET | Refills: 0 | Status: SHIPPED | OUTPATIENT
Start: 2024-06-03 | End: 2024-06-03

## 2024-06-03 RX ORDER — AMOXICILLIN AND CLAVULANATE POTASSIUM 875; 125 MG/1; MG/1
1 TABLET, FILM COATED ORAL 2 TIMES DAILY
Qty: 20 TABLET | Refills: 0 | Status: SHIPPED | OUTPATIENT
Start: 2024-06-03 | End: 2024-06-13

## 2024-06-03 NOTE — PROGRESS NOTES
48 y.o. female patient presents for evaluation of sinus pressure. Patient reports 10 days of progressively worsening maxillary sinus pain, nasal congestion, and headache. There is reported mild postnasal drip and ear pressure. No fever, persistent cough, or other constitutional signs and symptoms. Symptoms have been refractory to over-the-counter medications.      Vitals:    06/03/24 1133   BP: 102/70   Pulse: 91   Resp: 18   Temp: 36.5 °C (97.7 °F)   SpO2: 96%       No Known Allergies    Medication Documentation Review Audit       Reviewed by Venita Rangel MA (Medical Assistant) on 06/03/24 at 1138      Medication Order Taking? Sig Documenting Provider Last Dose Status   cholecalciferol (Vitamin D3) 1,250 mcg (50,000 unit) tablet [id number] Yes Take 1 tablet (50,000 Units) by mouth 1 (one) time per week. William Nieto MD Taking Active   colchicine 0.6 mg tablet [id number] Yes Take 1 tablet (0.6 mg) by mouth 3 times a day as needed. Historical Provider, MD Taking Active   dutasteride (Avodart) 0.5 mg capsule [id number] Yes Take 1 capsule (0.5 mg) by mouth once daily. Historical Provider, MD Taking Active   estradiol (Climara) 0.05 mg/24 hr patch [id number] Yes Place 1 patch on the skin 1 (one) time per week. Historical Provider, MD Taking Active     Discontinued 05/30/24 0958   eszopiclone (Lunesta) 3 mg tablet [id number] Yes Take 1 tablet (3 mg) by mouth once daily at bedtime. Take immediately before bedtime William Nieto MD Taking Active   hydrocortisone 2.5 % ointment [id number] Yes Apply 1 Application topically 2 times a day as needed. Historical Provider, MD Taking Active   progesterone (Prometrium) 100 mg capsule [id number] Yes Take 1 capsule (100 mg) by mouth once daily. Historical Provider, MD Taking Active   spironolactone (Aldactone) 25 mg tablet [id number] Yes Take 1 tablet (25 mg) by mouth once daily. William Nieto MD Taking Active   SUMAtriptan (Imitrex) 100 mg tablet [id number] Yes Take 1 tablet (100 mg)  by mouth 1 time if needed for migraine. William Nieto MD Taking Active   tirzepatide, weight loss, (Zepbound) 5 mg/0.5 mL injection [id number] Yes Inject 5 mg under the skin every 7 days. William Nieto MD Taking Active   valACYclovir (Valtrex) 1 gram tablet [id number] Yes Take 2 tablets at onset and then repeat in 12 hours. William Nieto MD Taking Active                    Past Medical History:   Diagnosis Date    Personal history of other diseases of the respiratory system 01/15/2017    History of sinusitis       Past Surgical History:   Procedure Laterality Date    OTHER SURGICAL HISTORY  12/12/2022    Elbow surgery    OTHER SURGICAL HISTORY  12/12/2022    Knee replacement    OTHER SURGICAL HISTORY  12/12/2022    Breast biopsy    OTHER SURGICAL HISTORY  12/12/2022    Knee arthroscopy    OTHER SURGICAL HISTORY  12/12/2022    Oophorectomy       ROS  See HPI    Physical Exam  Vitals and nursing note reviewed.   Constitutional:       General: She is not in acute distress.     Appearance: Normal appearance. She is not ill-appearing or toxic-appearing.   HENT:      Head: Normocephalic and atraumatic.      Right Ear: Tympanic membrane and ear canal normal.      Left Ear: Tympanic membrane and ear canal normal.      Nose: Congestion present.      Mouth/Throat:      Mouth: Mucous membranes are moist.      Pharynx: Oropharynx is clear.   Eyes:      Extraocular Movements: Extraocular movements intact.      Conjunctiva/sclera: Conjunctivae normal.      Pupils: Pupils are equal, round, and reactive to light.   Cardiovascular:      Rate and Rhythm: Normal rate and regular rhythm.   Pulmonary:      Effort: Pulmonary effort is normal.      Breath sounds: Normal breath sounds.   Lymphadenopathy:      Cervical: No cervical adenopathy.   Skin:     General: Skin is warm and dry.      Capillary Refill: Capillary refill takes less than 2 seconds.   Neurological:      General: No focal deficit present.      Mental Status: She is  alert and oriented to person, place, and time.   Psychiatric:         Mood and Affect: Mood normal.         Behavior: Behavior normal.           Assessment/Plan/MDM  Brinda was seen today for uri.  Diagnoses and all orders for this visit:  Acute non-recurrent maxillary sinusitis (Primary)  -     amoxicillin-pot clavulanate (Augmentin) 875-125 mg tablet; Take 1 tablet by mouth 2 times a day for 10 days.  -     fluconazole (Diflucan) 150 mg tablet; Take 1 tablet (150 mg) by mouth 1 time for 1 dose.    Encouraged pt to continue otc cold remedies PRN, push PO fluids and rest. Patient's clinical presentation is otherwise unremarkable at this time. Patient is discharged with instructions to follow-up with primary care or seek emergency medical attention for worsening symptoms or any new concerns.    I did personally review Brinda's past medical history, surgical history, social history, as well as family history (when relevant).  In this case, I also oversaw the her drug management by reviewing her medication list, allergy list, as well as the medications that I prescribed during the UC course and/or recommended as an out-patient (including possible OTC medications such as acetaminophen, NSAIDs , etc).    After reviewing the items above, I did look at previous medical documentation, such as recent hospitalizations, office visits, and/or recent consultations with PCP/specialist.                          SDOH:   Another factor that I considered in Brinda's care was her Social Determinants of Health (SDOH). During this UC encounter, she did not have social determinants of health. Those SDOH influencing Brinda's care are: none      Damon Mcdaniels CNP  [facility]  [phone]

## 2024-06-04 ENCOUNTER — APPOINTMENT (OUTPATIENT)
Dept: PRIMARY CARE | Facility: CLINIC | Age: 49
End: 2024-06-04
Payer: COMMERCIAL

## 2024-07-02 ENCOUNTER — APPOINTMENT (OUTPATIENT)
Dept: PRIMARY CARE | Facility: CLINIC | Age: 49
End: 2024-07-02
Payer: COMMERCIAL

## 2024-07-03 ENCOUNTER — APPOINTMENT (OUTPATIENT)
Dept: PRIMARY CARE | Facility: CLINIC | Age: 49
End: 2024-07-03
Payer: COMMERCIAL

## 2024-07-13 ENCOUNTER — TELEPHONE (OUTPATIENT)
Dept: PRIMARY CARE | Facility: CLINIC | Age: 49
End: 2024-07-13

## 2024-07-13 DIAGNOSIS — R73.03 PREDIABETES: ICD-10-CM

## 2024-07-13 DIAGNOSIS — E66.01 MORBID OBESITY WITH BMI OF 40.0-44.9, ADULT (MULTI): ICD-10-CM

## 2024-07-15 ENCOUNTER — APPOINTMENT (OUTPATIENT)
Dept: PRIMARY CARE | Facility: CLINIC | Age: 49
End: 2024-07-15
Payer: COMMERCIAL

## 2024-07-15 VITALS
DIASTOLIC BLOOD PRESSURE: 78 MMHG | BODY MASS INDEX: 42.29 KG/M2 | WEIGHT: 262 LBS | SYSTOLIC BLOOD PRESSURE: 126 MMHG | HEART RATE: 76 BPM | OXYGEN SATURATION: 98 %

## 2024-07-15 DIAGNOSIS — I89.0 LYMPHEDEMA: ICD-10-CM

## 2024-07-15 DIAGNOSIS — E66.01 MORBID OBESITY WITH BMI OF 40.0-44.9, ADULT (MULTI): ICD-10-CM

## 2024-07-15 DIAGNOSIS — Z86.03: ICD-10-CM

## 2024-07-15 DIAGNOSIS — L65.9 HAIR LOSS: ICD-10-CM

## 2024-07-15 DIAGNOSIS — E55.9 VITAMIN D DEFICIENCY: ICD-10-CM

## 2024-07-15 DIAGNOSIS — G47.00 PERSISTENT INSOMNIA: ICD-10-CM

## 2024-07-15 DIAGNOSIS — M17.11 PRIMARY OSTEOARTHRITIS OF RIGHT KNEE: ICD-10-CM

## 2024-07-15 DIAGNOSIS — R21 RASH AND NONSPECIFIC SKIN ERUPTION: ICD-10-CM

## 2024-07-15 DIAGNOSIS — Z96.652 STATUS POST TOTAL LEFT KNEE REPLACEMENT: ICD-10-CM

## 2024-07-15 DIAGNOSIS — E87.6 HYPOKALEMIA: ICD-10-CM

## 2024-07-15 DIAGNOSIS — Z79.899 MEDICATION MANAGEMENT: Primary | ICD-10-CM

## 2024-07-15 DIAGNOSIS — M10.9 GOUTY ARTHRITIS OF GREAT TOE: ICD-10-CM

## 2024-07-15 DIAGNOSIS — G43.009 MIGRAINE WITHOUT AURA AND WITHOUT STATUS MIGRAINOSUS, NOT INTRACTABLE: ICD-10-CM

## 2024-07-15 DIAGNOSIS — G47.00 INSOMNIA, UNSPECIFIED TYPE: ICD-10-CM

## 2024-07-15 DIAGNOSIS — R73.03 PREDIABETES: ICD-10-CM

## 2024-07-15 DIAGNOSIS — E78.2 MIXED HYPERLIPIDEMIA: ICD-10-CM

## 2024-07-15 DIAGNOSIS — B00.1 RECURRENT COLD SORES: ICD-10-CM

## 2024-07-15 DIAGNOSIS — F41.9 ANXIETY: ICD-10-CM

## 2024-07-15 LAB
AMPHETAMINES UR QL SCN: NORMAL
BARBITURATES UR QL SCN: NORMAL
BENZODIAZ UR QL SCN: NORMAL
BZE UR QL SCN: NORMAL
CANNABINOIDS UR QL SCN: NORMAL
FENTANYL+NORFENTANYL UR QL SCN: NORMAL
METHADONE UR QL SCN: NORMAL
OPIATES UR QL SCN: NORMAL
OXYCODONE+OXYMORPHONE UR QL SCN: NORMAL
PCP UR QL SCN: NORMAL

## 2024-07-15 PROCEDURE — 1036F TOBACCO NON-USER: CPT | Performed by: FAMILY MEDICINE

## 2024-07-15 PROCEDURE — 80307 DRUG TEST PRSMV CHEM ANLYZR: CPT

## 2024-07-15 PROCEDURE — 3008F BODY MASS INDEX DOCD: CPT | Performed by: FAMILY MEDICINE

## 2024-07-15 PROCEDURE — 99214 OFFICE O/P EST MOD 30 MIN: CPT | Performed by: FAMILY MEDICINE

## 2024-07-15 RX ORDER — VALACYCLOVIR HYDROCHLORIDE 1 G/1
TABLET, FILM COATED ORAL
Qty: 28 TABLET | Refills: 0 | Status: SHIPPED | OUTPATIENT
Start: 2024-07-15

## 2024-07-15 RX ORDER — TIRZEPATIDE 5 MG/.5ML
INJECTION, SOLUTION SUBCUTANEOUS
Qty: 2 ML | Refills: 1 | OUTPATIENT
Start: 2024-07-15

## 2024-07-15 RX ORDER — SUMATRIPTAN SUCCINATE 100 MG/1
100 TABLET ORAL ONCE AS NEEDED
Qty: 27 TABLET | Refills: 0 | Status: SHIPPED | OUTPATIENT
Start: 2024-07-15 | End: 2024-07-15

## 2024-07-15 RX ORDER — SUMATRIPTAN SUCCINATE 100 MG/1
100 TABLET ORAL ONCE AS NEEDED
Qty: 27 TABLET | Refills: 0 | Status: SHIPPED | OUTPATIENT
Start: 2024-07-15

## 2024-07-15 RX ORDER — ESZOPICLONE 3 MG/1
3 TABLET, FILM COATED ORAL NIGHTLY
Qty: 30 TABLET | Refills: 0 | Status: SHIPPED | OUTPATIENT
Start: 2024-07-15 | End: 2024-07-16 | Stop reason: SDUPTHER

## 2024-07-15 RX ORDER — ERGOCALCIFEROL 1.25 MG/1
50000 CAPSULE ORAL
Qty: 12 CAPSULE | Refills: 3 | Status: SHIPPED | OUTPATIENT
Start: 2024-07-15

## 2024-07-15 RX ORDER — ERGOCALCIFEROL 1.25 MG/1
50000 CAPSULE ORAL
COMMUNITY
Start: 2024-04-01 | End: 2024-07-15 | Stop reason: SDUPTHER

## 2024-07-15 NOTE — PROGRESS NOTES
Subjective   Patient ID: Brinda Espinosa is a 49 y.o. female who presents for Follow-up.    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  Her mothers health issues with memory raise the stress.   She has been to counseling a few times with [facility] in [place]. No recent visits.     Insomnia - on Lunesta not every day. 1/2 to 1 of them.  OARRS is consistent with that.  CSA 7/15/24 UDS 5/15/23 as expected for medication profile 7/15/24 as expected for medication profile      Obesity and Prediabetes  - She was on Mounjaro 15 when coupon available.  So off since the . And had lost 44 pounds and has kept it down for the last 9 months.  Last A1C was 5.6% in August.  No Chest pain, Dyspnea, palpitations, numbness, weakness, edema, claudication, or double vision/ loss of vision. Occasional racing heart with stress. Now on Zepbound  5. But wonders if the affecting absorption of the estrogen.  She feels she would lose weigh if able to exercise.   But had hysterectomy on  due to fibroids. Still would like to go to 7.5 mg.. BMI 42       OA of the right knee and left TKR - still mild pain on left and more on right. No meds now except on occasion. Weight loss did help.      Hyperlipidemia - no recent checks. No meds.  225/45/162 in 2023. . Encourage more fruits and vegetables. FMH of CAD not clear as mom negative, father  of cancer early, grandparents with heart attack.  Non smoker      ETD right more than left most of time.  Dry nasal mucosa      Ovarian tumor borderline or precancerous. That has been removed. Gynecology follows and recently did NATASHA. Tumor markers negative in March.  US with a spot in the uterus      Hair loss - on Dutasteride from dermatology      Headaches - Migraines not as frequent.Only brand name Imitrex works due to titanium Dioxide.  Throbbing, photophobia, and nausea. Better with hydration and limiting caffeine. Brand name Imitrex  helps. Dry mouth all the time.      Edema at times - She is on spironolactone now.      Uric acid is 9.1.  A few spells. She is steroids at times. She has not been on Allopurinol. Was on Hydrochlorothiazide.  Now on Aldactone to increase potassium, lower uric compared to hydrochlorothiazide and keep edema away.      Menopause on Hormone replacement. F? Decreased absorption with Zepbound       Recurrent cold sores - Every year or so. At the end of the year she got 90 day supply and they  and did not work this time. She would like to try the brand but not available     Shingles on back on right side about 3 months ago     Cologuard declined at present     Review of Systems    Objective   /78 (BP Location: Left arm, Patient Position: Sitting)   Pulse 76   Wt 119 kg (262 lb)   SpO2 98%   BMI 42.29 kg/m²     Physical Exam  Vitals reviewed.   Constitutional:       General: She is not in acute distress.     Appearance: Normal appearance. She is obese.   HENT:      Head: Normocephalic.      Right Ear: Tympanic membrane, ear canal and external ear normal.      Left Ear: Tympanic membrane, ear canal and external ear normal.      Nose: Nose normal.      Mouth/Throat:      Mouth: Mucous membranes are moist.      Pharynx: Oropharynx is clear.   Eyes:      Extraocular Movements: Extraocular movements intact.      Conjunctiva/sclera: Conjunctivae normal.      Pupils: Pupils are equal, round, and reactive to light.   Neck:      Vascular: No carotid bruit.   Cardiovascular:      Rate and Rhythm: Normal rate and regular rhythm.      Pulses: Normal pulses.      Heart sounds: Normal heart sounds. No murmur heard.  Pulmonary:      Effort: Pulmonary effort is normal. No respiratory distress.      Breath sounds: Normal breath sounds.   Abdominal:      General: Abdomen is flat. Bowel sounds are normal. There is no distension.      Palpations: Abdomen is soft. There is no mass.      Tenderness: There is no abdominal  tenderness.   Musculoskeletal:      Cervical back: Normal range of motion and neck supple. No tenderness.   Lymphadenopathy:      Cervical: No cervical adenopathy.   Skin:     General: Skin is warm and dry.      Findings: No rash.   Neurological:      General: No focal deficit present.      Mental Status: She is alert and oriented to person, place, and time.   Psychiatric:         Mood and Affect: Mood normal.         Thought Content: Thought content normal.         Judgment: Judgment normal.         Assessment/Plan   Diagnoses and all orders for this visit:  Medication management  -     DRUG SCREEN,URINE  Morbid obesity with BMI of 40.0-44.9, adult (Multi)  -     Follow Up In Primary Care - Established  -     tirzepatide, weight loss, (Zepbound) 7.5 mg/0.5 mL injection; Inject 7.5 mg under the skin every 7 days.  -     Follow Up In Primary Care - Established; Future  Prediabetes  -     Follow Up In Primary Care - Established  -     tirzepatide, weight loss, (Zepbound) 7.5 mg/0.5 mL injection; Inject 7.5 mg under the skin every 7 days.  -     CBC; Future  -     Comprehensive Metabolic Panel; Future  -     Hemoglobin A1C; Future  Insomnia, unspecified type  -     eszopiclone (Lunesta) 3 mg tablet; Take 1 tablet (3 mg) by mouth once daily at bedtime. Take immediately before bedtime  Migraine without aura and without status migrainosus, not intractable  -     SUMAtriptan (Imitrex) 100 mg tablet; Take 1 tablet (100 mg) by mouth 1 time if needed for migraine.  Vitamin D deficiency  -     ergocalciferol (Vitamin D-2) 1.25 MG (41994 UT) capsule; Take 1 capsule (50,000 Units) by mouth 1 (one) time per week.  Rash and nonspecific skin eruption  Recurrent cold sores  -     valACYclovir (Valtrex) 1 gram tablet; Take 2 tablets at onset and then repeat in 12 hours.  Anxiety  Gouty arthritis of great toe  -     Uric Acid; Future  Hair loss  Hypokalemia  -     Comprehensive Metabolic Panel; Future  Lymphedema  -     Comprehensive  Metabolic Panel; Future  Mixed hyperlipidemia  -     Lipid Panel; Future  History of neoplasm of uncertain behavior of ovary  Persistent insomnia  Primary osteoarthritis of right knee  Status post total left knee replacement

## 2024-07-16 DIAGNOSIS — G47.00 INSOMNIA, UNSPECIFIED TYPE: ICD-10-CM

## 2024-07-16 RX ORDER — ESZOPICLONE 3 MG/1
3 TABLET, FILM COATED ORAL NIGHTLY
Qty: 30 TABLET | Refills: 0 | Status: SHIPPED | OUTPATIENT
Start: 2024-07-16 | End: 2024-08-15

## 2024-07-16 NOTE — TELEPHONE ENCOUNTER
Patient called in stating that she needs the lunesta called into [facility] as it is cheaper. The imitrex needs a prior auth for [facility].

## 2024-07-17 NOTE — TELEPHONE ENCOUNTER
tirzepatide, weight loss, (Zepbound) 7.5 mg/0.5 mL injection [[id number]]    Order Details    Dose: 7.5 mg Route: subcutaneous Frequency: Every 7 days   Dispense Quantity: 4 each Refills: 2          Sig: Inject 7.5 mg under the skin every 7 days.         [facility] She said to wait for 2 wks then send it. Thank you.

## 2024-08-19 ENCOUNTER — TELEPHONE (OUTPATIENT)
Dept: PRIMARY CARE | Facility: CLINIC | Age: 49
End: 2024-08-19
Payer: COMMERCIAL

## 2024-08-19 DIAGNOSIS — G47.00 INSOMNIA, UNSPECIFIED TYPE: ICD-10-CM

## 2024-08-19 RX ORDER — ESZOPICLONE 3 MG/1
3 TABLET, FILM COATED ORAL NIGHTLY
Qty: 30 TABLET | Refills: 0 | Status: SHIPPED | OUTPATIENT
Start: 2024-08-19 | End: 2024-09-18

## 2024-08-19 NOTE — TELEPHONE ENCOUNTER
008/19/24  Patient is requesting a refill for   Lunesta 3 mg tablet   [facility]

## 2024-10-18 ENCOUNTER — TELEPHONE (OUTPATIENT)
Dept: PRIMARY CARE | Facility: CLINIC | Age: 49
End: 2024-10-18
Payer: COMMERCIAL

## 2024-10-18 DIAGNOSIS — R73.03 PREDIABETES: ICD-10-CM

## 2024-10-18 DIAGNOSIS — E66.01 MORBID OBESITY WITH BMI OF 40.0-44.9, ADULT (MULTI): Primary | ICD-10-CM

## 2024-10-18 RX ORDER — TIRZEPATIDE 10 MG/.5ML
10 INJECTION, SOLUTION SUBCUTANEOUS WEEKLY
Qty: 2 ML | Refills: 11 | Status: SHIPPED | OUTPATIENT
Start: 2024-10-18

## 2024-10-18 NOTE — TELEPHONE ENCOUNTER
tirzepatide, weight loss, (Zepbound) 7.5 mg/0.5 mL injection [[id number]]    Order Details  Dose: 7.5 mg Route: subcutaneous Frequency: Every 7 days   Dispense Quantity: 4 each Refills: 2          Sig: Inject 7.5 mg under the skin every 7 days.   SHE SAID SHE IS MAINTAINING HER WEIGHT AND WOULD LIKE TO LOSS MORE. WOULD LIKE TO HAVE THIS UPED TO 10 MG. [facility]. THANK YOU.

## 2024-10-25 ENCOUNTER — TELEPHONE (OUTPATIENT)
Dept: PRIMARY CARE | Facility: CLINIC | Age: 49
End: 2024-10-25
Payer: COMMERCIAL

## 2024-10-25 DIAGNOSIS — E66.01 MORBID OBESITY WITH BMI OF 40.0-44.9, ADULT (MULTI): ICD-10-CM

## 2024-10-25 DIAGNOSIS — R73.03 PREDIABETES: ICD-10-CM

## 2024-10-25 DIAGNOSIS — M10.9 GOUTY ARTHRITIS OF GREAT TOE: ICD-10-CM

## 2024-10-25 NOTE — TELEPHONE ENCOUNTER
Taranvm for patient that we just sent in Mounjaro 10mg.  Looks like she was on Zepbound previously, so I need her to clarify which medication she needs.

## 2024-10-28 RX ORDER — COLCHICINE 0.6 MG/1
0.6 TABLET ORAL 3 TIMES DAILY PRN
Qty: 90 TABLET | Refills: 0 | Status: SHIPPED | OUTPATIENT
Start: 2024-10-28

## 2024-11-01 ENCOUNTER — TELEPHONE (OUTPATIENT)
Dept: PRIMARY CARE | Facility: CLINIC | Age: 49
End: 2024-11-01
Payer: COMMERCIAL

## 2024-11-01 DIAGNOSIS — G47.00 INSOMNIA, UNSPECIFIED TYPE: ICD-10-CM

## 2024-11-01 RX ORDER — ESZOPICLONE 3 MG/1
3 TABLET, FILM COATED ORAL NIGHTLY
Qty: 30 TABLET | Refills: 0 | Status: SHIPPED | OUTPATIENT
Start: 2024-11-01 | End: 2024-12-01

## 2024-11-12 ENCOUNTER — TELEPHONE (OUTPATIENT)
Dept: PRIMARY CARE | Facility: CLINIC | Age: 49
End: 2024-11-12
Payer: COMMERCIAL

## 2024-11-12 DIAGNOSIS — B00.1 RECURRENT COLD SORES: ICD-10-CM

## 2024-11-12 RX ORDER — VALACYCLOVIR HYDROCHLORIDE 1 G/1
TABLET, FILM COATED ORAL
Qty: 28 TABLET | Refills: 0 | Status: SHIPPED | OUTPATIENT
Start: 2024-11-12 | End: 2024-11-13 | Stop reason: SDUPTHER

## 2024-11-13 ENCOUNTER — TELEPHONE (OUTPATIENT)
Dept: PRIMARY CARE | Facility: CLINIC | Age: 49
End: 2024-11-13
Payer: COMMERCIAL

## 2024-11-13 DIAGNOSIS — B00.1 RECURRENT COLD SORES: ICD-10-CM

## 2024-11-13 RX ORDER — VALACYCLOVIR HYDROCHLORIDE 1 G/1
TABLET, FILM COATED ORAL
Qty: 28 TABLET | Refills: 0 | Status: SHIPPED | OUTPATIENT
Start: 2024-11-13

## 2024-11-20 ENCOUNTER — TELEPHONE (OUTPATIENT)
Dept: PRIMARY CARE | Facility: CLINIC | Age: 49
End: 2024-11-20
Payer: COMMERCIAL

## 2024-11-20 DIAGNOSIS — R73.03 PREDIABETES: ICD-10-CM

## 2024-11-20 DIAGNOSIS — E66.01 MORBID OBESITY WITH BMI OF 40.0-44.9, ADULT (MULTI): ICD-10-CM

## 2024-11-20 NOTE — TELEPHONE ENCOUNTER
11/20/24  Patient would like a phone call to discuss the option of increasing her Zepbound 7.5 mg/0.5 ml.  She has an appointment in December.  Her last injection will be Friday of Saturday of this week.    Ph: [phone]

## 2024-11-20 NOTE — TELEPHONE ENCOUNTER
Lmvm for patient per Dr. Nieto, if she is feeling she has plateaued with the 7.5 would increase to 10 unless she is having a lot of side effects.   Rx has been sent to [facility]

## 2024-12-03 ENCOUNTER — LAB (OUTPATIENT)
Dept: LAB | Facility: LAB | Age: 49
End: 2024-12-03
Payer: COMMERCIAL

## 2024-12-03 DIAGNOSIS — R73.03 PREDIABETES: ICD-10-CM

## 2024-12-03 DIAGNOSIS — M10.9 GOUTY ARTHRITIS OF GREAT TOE: ICD-10-CM

## 2024-12-03 DIAGNOSIS — E87.6 HYPOKALEMIA: ICD-10-CM

## 2024-12-03 DIAGNOSIS — E78.2 MIXED HYPERLIPIDEMIA: ICD-10-CM

## 2024-12-03 DIAGNOSIS — I89.0 LYMPHEDEMA: ICD-10-CM

## 2024-12-03 LAB
ALBUMIN SERPL BCP-MCNC: 4.1 G/DL (ref 3.4–5)
ALP SERPL-CCNC: 52 U/L (ref 33–110)
ALT SERPL W P-5'-P-CCNC: 18 U/L (ref 7–45)
ANION GAP SERPL CALC-SCNC: 10 MMOL/L (ref 10–20)
AST SERPL W P-5'-P-CCNC: 14 U/L (ref 9–39)
BILIRUB SERPL-MCNC: 0.4 MG/DL (ref 0–1.2)
BUN SERPL-MCNC: 11 MG/DL (ref 6–23)
CALCIUM SERPL-MCNC: 9.2 MG/DL (ref 8.6–10.3)
CHLORIDE SERPL-SCNC: 106 MMOL/L (ref 98–107)
CHOLEST SERPL-MCNC: 201 MG/DL (ref 0–199)
CHOLESTEROL/HDL RATIO: 3
CO2 SERPL-SCNC: 30 MMOL/L (ref 21–32)
CREAT SERPL-MCNC: 0.69 MG/DL (ref 0.5–1.05)
EGFRCR SERPLBLD CKD-EPI 2021: >90 ML/MIN/1.73M*2
ERYTHROCYTE [DISTWIDTH] IN BLOOD BY AUTOMATED COUNT: 13 % (ref 11.5–14.5)
GLUCOSE SERPL-MCNC: 74 MG/DL (ref 74–99)
HCT VFR BLD AUTO: 44.2 % (ref 36–46)
HDLC SERPL-MCNC: 67 MG/DL
HGB BLD-MCNC: 13.9 G/DL (ref 12–16)
LDLC SERPL CALC-MCNC: 121 MG/DL
MCH RBC QN AUTO: 30.2 PG (ref 26–34)
MCHC RBC AUTO-ENTMCNC: 31.4 G/DL (ref 32–36)
MCV RBC AUTO: 96 FL (ref 80–100)
NON HDL CHOLESTEROL: 134 MG/DL (ref 0–149)
NRBC BLD-RTO: 0 /100 WBCS (ref 0–0)
PLATELET # BLD AUTO: 297 X10*3/UL (ref 150–450)
POTASSIUM SERPL-SCNC: 4.3 MMOL/L (ref 3.5–5.3)
PROT SERPL-MCNC: 6.4 G/DL (ref 6.4–8.2)
RBC # BLD AUTO: 4.61 X10*6/UL (ref 4–5.2)
SODIUM SERPL-SCNC: 142 MMOL/L (ref 136–145)
TRIGL SERPL-MCNC: 63 MG/DL (ref 0–149)
URATE SERPL-MCNC: 5.6 MG/DL (ref 2.3–6.7)
VLDL: 13 MG/DL (ref 0–40)
WBC # BLD AUTO: 8.4 X10*3/UL (ref 4.4–11.3)

## 2024-12-03 PROCEDURE — 80053 COMPREHEN METABOLIC PANEL: CPT

## 2024-12-03 PROCEDURE — 36415 COLL VENOUS BLD VENIPUNCTURE: CPT

## 2024-12-03 PROCEDURE — 85027 COMPLETE CBC AUTOMATED: CPT

## 2024-12-03 PROCEDURE — 83036 HEMOGLOBIN GLYCOSYLATED A1C: CPT

## 2024-12-03 PROCEDURE — 84550 ASSAY OF BLOOD/URIC ACID: CPT

## 2024-12-03 PROCEDURE — 80061 LIPID PANEL: CPT

## 2024-12-04 LAB
EST. AVERAGE GLUCOSE BLD GHB EST-MCNC: 108 MG/DL
HBA1C MFR BLD: 5.4 %

## 2024-12-05 ENCOUNTER — APPOINTMENT (OUTPATIENT)
Dept: PRIMARY CARE | Facility: CLINIC | Age: 49
End: 2024-12-05
Payer: COMMERCIAL

## 2024-12-05 VITALS
WEIGHT: 256 LBS | SYSTOLIC BLOOD PRESSURE: 118 MMHG | BODY MASS INDEX: 41.14 KG/M2 | OXYGEN SATURATION: 97 % | HEART RATE: 80 BPM | DIASTOLIC BLOOD PRESSURE: 80 MMHG | HEIGHT: 66 IN

## 2024-12-05 DIAGNOSIS — G47.00 INSOMNIA, UNSPECIFIED TYPE: ICD-10-CM

## 2024-12-05 DIAGNOSIS — R73.03 PREDIABETES: ICD-10-CM

## 2024-12-05 DIAGNOSIS — E66.01 MORBID OBESITY WITH BMI OF 40.0-44.9, ADULT (MULTI): ICD-10-CM

## 2024-12-05 DIAGNOSIS — R60.0 LOCALIZED EDEMA: ICD-10-CM

## 2024-12-05 PROCEDURE — 1036F TOBACCO NON-USER: CPT | Performed by: FAMILY MEDICINE

## 2024-12-05 PROCEDURE — 99213 OFFICE O/P EST LOW 20 MIN: CPT | Performed by: FAMILY MEDICINE

## 2024-12-05 PROCEDURE — 3008F BODY MASS INDEX DOCD: CPT | Performed by: FAMILY MEDICINE

## 2024-12-05 RX ORDER — ESZOPICLONE 3 MG/1
3 TABLET, FILM COATED ORAL NIGHTLY
Qty: 30 TABLET | Refills: 0 | Status: SHIPPED | OUTPATIENT
Start: 2024-12-05 | End: 2025-01-04

## 2024-12-05 RX ORDER — SPIRONOLACTONE 25 MG/1
25 TABLET ORAL DAILY
Qty: 90 TABLET | Refills: 3 | Status: SHIPPED | OUTPATIENT
Start: 2024-12-05 | End: 2025-12-05

## 2024-12-05 NOTE — PROGRESS NOTES
"Subjective   Patient ID: Brinda Espinosa is a 49 y.o. female who presents for Follow-up (Discuss Zepbound).    HPI   The Zepbound was initially not covered. She has a BMI if 41 and so qualifies since over 35.  She is doing well with that when able to be on it. Since not covered  she has been off and on when she could afford it. In spite of this she has been able to drop her weight from 298 down to 256 where she has plateaued for a year. Started in 2022.  They say she can even get coverage back a month. So I will send this in and see if we can get is covered. A1C has been up to 5.6%  Now 5.4%.  CMP WNL.  Lipds are geat with increase in HDL from 45 to 67. LDL dropped form 162 5o 121.     No Chest pain, Dyspnea, palpitations, numbness, weakness, claudications, or double vision/ loss of vision.      She is on Lunesta about 30 every other month.   CSA 7/15/24   UDS 5/15/23 as expected for medication profile 7/15/24 - as expected for medication profile OARRS is consistent with that.   I have personally reviewed the patients OARRS report. I have considered the risks of abuse, addiction and diversion. I believe it is clinically appropriate to continue to prescribe this medication.    Review of Systems    Objective   /80 (BP Location: Left arm, Patient Position: Sitting)   Pulse 80   Ht 1.664 m (5' 5.5\")   Wt 116 kg (256 lb)   SpO2 97%   BMI 41.95 kg/m²     Physical Exam  Constitutional:       Appearance: Normal appearance. She is obese.   Cardiovascular:      Rate and Rhythm: Normal rate and regular rhythm.      Heart sounds: No murmur heard.  Pulmonary:      Effort: Pulmonary effort is normal. No respiratory distress.      Breath sounds: Normal breath sounds. No wheezing.   Abdominal:      General: Abdomen is flat. Bowel sounds are normal. There is no distension.      Palpations: Abdomen is soft. There is no mass.      Tenderness: There is no abdominal tenderness.   Skin:     General: Skin is warm and dry. "   Neurological:      Mental Status: She is alert.   Psychiatric:         Mood and Affect: Mood normal.         Behavior: Behavior normal.         Thought Content: Thought content normal.         Judgment: Judgment normal.         Assessment/Plan   Diagnoses and all orders for this visit:  Localized edema  -     spironolactone (Aldactone) 25 mg tablet; Take 1 tablet (25 mg) by mouth once daily.  Insomnia, unspecified type  -     eszopiclone (Lunesta) 3 mg tablet; Take 1 tablet (3 mg) by mouth once daily at bedtime. Take immediately before bedtime  Morbid obesity with BMI of 40.0-44.9, adult (Multi)  -     tirzepatide, weight loss, (Zepbound) 10 mg/0.5 mL injection; Inject 10 mg under the skin every 7 days.  -     Follow Up In Primary Care - Established; Future  Prediabetes

## 2024-12-06 DIAGNOSIS — E66.01 MORBID OBESITY WITH BMI OF 40.0-44.9, ADULT (MULTI): ICD-10-CM

## 2024-12-06 DIAGNOSIS — R73.03 PREDIABETES: ICD-10-CM

## 2024-12-06 RX ORDER — TIRZEPATIDE 10 MG/.5ML
10 INJECTION, SOLUTION SUBCUTANEOUS WEEKLY
Qty: 2 ML | Refills: 11 | Status: SHIPPED | OUTPATIENT
Start: 2024-12-06

## 2024-12-06 NOTE — TELEPHONE ENCOUNTER
Pc to patient and let her know per Abril at her insurance, the Zepbound isn't covered, as it's not on her formulary.  She is willing to try the Mounjaro  Rx proposed  for Mounjaro.  I also let her know Abril told me that the Imitrex doesn't require a prior auth for brand name, as it goes through without it.  Per patient, it's because it needs to be a tier exception with insurance, as it's still $600.  She said she will call the insurance and talk to them about that.

## 2024-12-17 ENCOUNTER — TELEPHONE (OUTPATIENT)
Dept: PRIMARY CARE | Facility: CLINIC | Age: 49
End: 2024-12-17
Payer: COMMERCIAL

## 2024-12-17 NOTE — TELEPHONE ENCOUNTER
Spoke with Amy with the Exceptions Team at [facility] and was able to get the brand Imitrex approved with a $40 copay. (Due to patient having an inadequate response to the generic equivalent). That approval is good through 12/17/2027.  PA number is [id number].  I left a message for patient with this information and told her the pharmacy just needs to run the Rx through again and she should just need to pay $40.

## 2024-12-17 NOTE — TELEPHONE ENCOUNTER
12/17/24  Suzette from [employer] called to complete a brand penalty exception form.  Charging her $600,41 for using the brand.  Brinda is very unhappy about the situation.   Form sent in March and again on December 7.      Ph; [phone]

## 2024-12-30 ENCOUNTER — TELEPHONE (OUTPATIENT)
Dept: PRIMARY CARE | Facility: CLINIC | Age: 49
End: 2024-12-30
Payer: COMMERCIAL

## 2024-12-30 DIAGNOSIS — E66.01 MORBID OBESITY WITH BMI OF 40.0-44.9, ADULT (MULTI): Primary | ICD-10-CM

## 2024-12-30 NOTE — TELEPHONE ENCOUNTER
Message from patient that insurance won't cover Mounjaro now, so she is requesting Rx for Zepbound 10mg    [facility]

## 2025-02-03 DIAGNOSIS — E66.01 MORBID OBESITY WITH BMI OF 40.0-44.9, ADULT (MULTI): ICD-10-CM

## 2025-02-03 NOTE — TELEPHONE ENCOUNTER
Pt is requesting refill on zepbound - does have new insurance   [facility] [place] [facility]  would like PA done if needed   Refill proposed

## 2025-03-12 DIAGNOSIS — G47.00 INSOMNIA, UNSPECIFIED TYPE: ICD-10-CM

## 2025-03-12 DIAGNOSIS — E66.01 MORBID OBESITY WITH BMI OF 40.0-44.9, ADULT (MULTI): ICD-10-CM

## 2025-03-12 NOTE — TELEPHONE ENCOUNTER
tirzepatide, weight loss, (Zepbound) 10 mg/0.5 mL injection [[id number]]    Order Details  Dose: 10 mg Route: subcutaneous Frequency: Every 7 days   Dispense Quantity: 4 each Refills: 0          Sig: Inject 10 mg under the skin every 7 days.   eszopiclone (Lunesta) 3 mg tablet [[id number]]      Order Details  Dose: 3 mg Route: oral Frequency: Nightly   Dispense Quantity: 30 tablet Refills: 0          Sig: Take 1 tablet (3 mg) by mouth once daily at bedtime. Take immediately before bedtime   [facility]. THANK YOU.

## 2025-03-12 NOTE — TELEPHONE ENCOUNTER
CAN YOU WRITE A MEDICAL NECESSARY LETTER FOR HER INSURANCE TO COVER ZEPBOUND. YOU CAN CALL HER IF YOU HAVE ANY QUESTIONS ABOUT THE LETTER. THANK YOU. SHE HAS A RISK FOR DIABETES AND DEMENTIA, AND BMI.

## 2025-03-14 RX ORDER — ESZOPICLONE 3 MG/1
3 TABLET, FILM COATED ORAL NIGHTLY
Qty: 30 TABLET | Refills: 0 | Status: SHIPPED | OUTPATIENT
Start: 2025-03-14 | End: 2025-04-13

## 2025-04-04 ENCOUNTER — APPOINTMENT (OUTPATIENT)
Dept: PRIMARY CARE | Facility: CLINIC | Age: 50
End: 2025-04-04
Payer: COMMERCIAL

## 2025-04-04 VITALS
BODY MASS INDEX: 41.3 KG/M2 | HEART RATE: 79 BPM | WEIGHT: 252 LBS | DIASTOLIC BLOOD PRESSURE: 72 MMHG | OXYGEN SATURATION: 96 % | SYSTOLIC BLOOD PRESSURE: 124 MMHG

## 2025-04-04 DIAGNOSIS — E55.9 VITAMIN D DEFICIENCY: ICD-10-CM

## 2025-04-04 DIAGNOSIS — M79.675 PAIN OF LEFT GREAT TOE: Primary | ICD-10-CM

## 2025-04-04 DIAGNOSIS — Z79.899 CONTROLLED SUBSTANCE AGREEMENT SIGNED: ICD-10-CM

## 2025-04-04 PROCEDURE — 1036F TOBACCO NON-USER: CPT

## 2025-04-04 PROCEDURE — 99214 OFFICE O/P EST MOD 30 MIN: CPT

## 2025-04-04 RX ORDER — KETOCONAZOLE 20 MG/ML
1 SHAMPOO, SUSPENSION TOPICAL WEEKLY
COMMUNITY
Start: 2025-03-13

## 2025-04-04 RX ORDER — CHOLECALCIFEROL (VITAMIN D3) 1250 MCG
50000 TABLET ORAL
Qty: 12 TABLET | Refills: 3 | Status: SHIPPED | OUTPATIENT
Start: 2025-04-04

## 2025-04-04 RX ORDER — CHOLECALCIFEROL (VITAMIN D3) 1250 MCG
1250 TABLET ORAL
COMMUNITY
End: 2025-04-04 | Stop reason: SDUPTHER

## 2025-04-04 ASSESSMENT — PATIENT HEALTH QUESTIONNAIRE - PHQ9
1. LITTLE INTEREST OR PLEASURE IN DOING THINGS: NOT AT ALL
2. FEELING DOWN, DEPRESSED OR HOPELESS: NOT AT ALL
SUM OF ALL RESPONSES TO PHQ9 QUESTIONS 1 AND 2: 0

## 2025-04-04 NOTE — PROGRESS NOTES
Subjective   Patient ID: Brinda Espinosa is a 49 y.o. female who presents for Med Management.    The patient presents today for medication management.    Insomnia: Takes Lunesta as needed nightly.  Medication works well.  New CSA and UDS obtained.  No side effects reported.    Toe inflammation: Patient suspicious of gout.  Modification to left great toe, pain on palpation, no warmth.  Previous x-ray she was told showed no signs of gout.  Will order serum uric acid and have patient get drawn during her next exacerbation.    BMI 41.3: She would like to be prescribed SQ Zepbound, does qualify for usage.  Has done well with this.  Her insurance covered initially and then changed to not covering.  A1c does not meet criteria for coverage however BMI does.  She is going to contact her insurance company to find out what specific forms for documentation needed from me for approval of the medication.             Review of Systems   Constitutional:  Negative for chills, diaphoresis, fatigue and unexpected weight change.   HENT:  Negative for dental problem, tinnitus and trouble swallowing.    Eyes:  Negative for visual disturbance.   Respiratory:  Negative for chest tightness and shortness of breath.    Cardiovascular:  Negative for chest pain, palpitations and leg swelling.   Gastrointestinal:  Negative for abdominal pain, constipation, diarrhea, nausea and rectal pain.   Endocrine: Negative for polydipsia, polyphagia and polyuria.   Genitourinary:  Negative for difficulty urinating, frequency and urgency.   Musculoskeletal:  Negative for arthralgias and myalgias.   Skin:  Negative for pallor and wound.   Neurological:  Negative for syncope, weakness, numbness and headaches.   Psychiatric/Behavioral:  Negative for suicidal ideas. The patient is not nervous/anxious.        Objective   /72 (BP Location: Left arm, Patient Position: Sitting)   Pulse 79   Wt 114 kg (252 lb)   SpO2 96%   BMI 41.30 kg/m²     Physical  Exam  Vitals and nursing note reviewed.   Constitutional:       General: She is not in acute distress.     Appearance: Normal appearance. She is obese.   HENT:      Head: Normocephalic.      Nose: Nose normal.      Mouth/Throat:      Mouth: Mucous membranes are moist.      Pharynx: Oropharynx is clear.   Eyes:      General: No scleral icterus.     Pupils: Pupils are equal, round, and reactive to light.   Neck:      Vascular: No carotid bruit.   Cardiovascular:      Rate and Rhythm: Normal rate and regular rhythm.      Pulses: Normal pulses.      Heart sounds: Normal heart sounds. No murmur heard.  Pulmonary:      Effort: Pulmonary effort is normal. No respiratory distress.      Breath sounds: Normal breath sounds. No stridor. No wheezing, rhonchi or rales.   Abdominal:      General: Bowel sounds are normal. There is no distension.      Palpations: Abdomen is soft.      Tenderness: There is no abdominal tenderness. There is no right CVA tenderness or left CVA tenderness.   Musculoskeletal:         General: Swelling (Left great toe proximal) present. Normal range of motion.      Cervical back: Normal range of motion.      Right lower leg: No edema.      Left lower leg: No edema.   Skin:     General: Skin is warm and dry.      Capillary Refill: Capillary refill takes less than 2 seconds.   Neurological:      General: No focal deficit present.      Mental Status: She is alert and oriented to person, place, and time. Mental status is at baseline.   Psychiatric:         Mood and Affect: Mood normal.         Behavior: Behavior normal.         Thought Content: Thought content normal.         Judgment: Judgment normal.         Assessment/Plan   Diagnoses and all orders for this visit:  Pain of left great toe  -     Uric acid; Future  Vitamin D deficiency  -     cholecalciferol (Vitamin D3) 1,250 mcg (50,000 unit) tablet; Take 1 tablet (50,000 Units) by mouth every 7 days.  Controlled substance agreement signed  -     DRUG  SCREEN,URINE; Future

## 2025-04-05 LAB
AMPHETAMINES UR QL: NEGATIVE NG/ML
BARBITURATES UR QL: NEGATIVE NG/ML
BENZODIAZ UR QL: NEGATIVE NG/ML
BZE UR QL: NEGATIVE NG/ML
CREAT UR-MCNC: 179 MG/DL
METHADONE UR QL: NEGATIVE NG/ML
OPIATES UR QL: POSITIVE NG/ML
OXIDANTS UR QL: NEGATIVE MCG/ML
OXYCODONE UR QL: NEGATIVE NG/ML
PH UR: 5.1 [PH] (ref 4.5–9)
QUEST NOTES AND COMMENTS: ABNORMAL
THC UR QL: NEGATIVE NG/ML

## 2025-04-05 ASSESSMENT — ENCOUNTER SYMPTOMS
MYALGIAS: 0
SHORTNESS OF BREATH: 0
NUMBNESS: 0
CHILLS: 0
WOUND: 0
CONSTIPATION: 0
DIFFICULTY URINATING: 0
ARTHRALGIAS: 0
DIAPHORESIS: 0
POLYPHAGIA: 0
WEAKNESS: 0
UNEXPECTED WEIGHT CHANGE: 0
NAUSEA: 0
ABDOMINAL PAIN: 0
FATIGUE: 0
CHEST TIGHTNESS: 0
TROUBLE SWALLOWING: 0
DIARRHEA: 0
HEADACHES: 0
NERVOUS/ANXIOUS: 0
PALPITATIONS: 0
FREQUENCY: 0
RECTAL PAIN: 0
POLYDIPSIA: 0

## 2025-04-18 LAB — URATE SERPL-MCNC: 5.7 MG/DL (ref 2.5–7)

## 2025-04-24 ENCOUNTER — TELEPHONE (OUTPATIENT)
Dept: PRIMARY CARE | Facility: CLINIC | Age: 50
End: 2025-04-24
Payer: COMMERCIAL

## 2025-04-24 DIAGNOSIS — M79.675 PAIN OF LEFT GREAT TOE: Primary | ICD-10-CM

## 2025-04-24 NOTE — TELEPHONE ENCOUNTER
Pt is calling regarding her recent urine drug screen- she does not know why it is showing positive for opioids - she does not take any and never has- she would like to know how this is showing positive

## 2025-04-25 ENCOUNTER — TELEPHONE (OUTPATIENT)
Dept: PRIMARY CARE | Facility: CLINIC | Age: 50
End: 2025-04-25
Payer: COMMERCIAL

## 2025-04-25 DIAGNOSIS — E66.01 MORBID OBESITY WITH BMI OF 40.0-44.9, ADULT (MULTI): Primary | ICD-10-CM

## 2025-04-25 NOTE — TELEPHONE ENCOUNTER
Spoke to pt and let her know that we did fill out the appeal form, but we needed the denial letter and the PA reference # found on the denial letter and she needed to come in and sign the bottom of the form before we could fax it, she expressed an understanding and stated she would be in on Monday to sign this and bring in the letter with the number to fax back to the insurance company. Form in folders up front.

## 2025-04-28 ENCOUNTER — TELEPHONE (OUTPATIENT)
Dept: PRIMARY CARE | Facility: CLINIC | Age: 50
End: 2025-04-28
Payer: COMMERCIAL

## 2025-04-29 ENCOUNTER — TELEPHONE (OUTPATIENT)
Dept: PRIMARY CARE | Facility: CLINIC | Age: 50
End: 2025-04-29
Payer: COMMERCIAL

## 2025-04-29 NOTE — TELEPHONE ENCOUNTER
Amy Bauman contacted insurance and did the PA needed for medication and informed us we did not need to fax the appeals form unless it was truly denied through insurance.

## 2025-04-29 NOTE — TELEPHONE ENCOUNTER
Requesting refill for Zepbound.  Currently on 10mg and would like that increased to 12.5mg    [facility]

## 2025-05-08 ENCOUNTER — TELEPHONE (OUTPATIENT)
Dept: PRIMARY CARE | Facility: CLINIC | Age: 50
End: 2025-05-08
Payer: COMMERCIAL

## 2025-05-08 DIAGNOSIS — G47.00 INSOMNIA, UNSPECIFIED TYPE: ICD-10-CM

## 2025-05-08 RX ORDER — ESZOPICLONE 3 MG/1
3 TABLET, FILM COATED ORAL NIGHTLY
Qty: 30 TABLET | Refills: 0 | Status: SHIPPED | OUTPATIENT
Start: 2025-05-08 | End: 2025-06-07

## 2025-05-20 ENCOUNTER — TELEPHONE (OUTPATIENT)
Dept: PRIMARY CARE | Facility: CLINIC | Age: 50
End: 2025-05-20
Payer: COMMERCIAL

## 2025-05-20 NOTE — TELEPHONE ENCOUNTER
I called patient and let her know that we still have the Appeal form from [employer] for the [employer], that she needs to sign before it can be sent in. Donnie has his part completed, she just needs to stop in the office to do that.  Patient stated she can stop by this Friday 5/23/25, to do that.  I advised her to stop by the  and ask for Brionna, who has the form.  She also wanted it noted that in the time she hasn't had the medication, she has gained 7 pounds.

## 2025-05-21 NOTE — TELEPHONE ENCOUNTER
Form filled out and placed up front for pt to come in and sign on Friday as stated in previous message.

## 2025-06-09 ENCOUNTER — HOSPITAL ENCOUNTER (OUTPATIENT)
Dept: RADIOLOGY | Facility: CLINIC | Age: 50
Discharge: HOME | End: 2025-06-09
Payer: COMMERCIAL

## 2025-06-09 DIAGNOSIS — M79.672 PAIN IN LEFT FOOT: ICD-10-CM

## 2025-06-09 PROCEDURE — 73630 X-RAY EXAM OF FOOT: CPT | Mod: LEFT SIDE | Performed by: RADIOLOGY

## 2025-06-09 PROCEDURE — 73630 X-RAY EXAM OF FOOT: CPT | Mod: LT

## 2025-06-16 ENCOUNTER — TELEPHONE (OUTPATIENT)
Dept: PRIMARY CARE | Facility: CLINIC | Age: 50
End: 2025-06-16
Payer: COMMERCIAL

## 2025-06-16 NOTE — TELEPHONE ENCOUNTER
"Pt called in requesting to talk to someone about meds for depression. Stated that she is having issues sleeping, is sad, and is just having a hard time. Pt wanted meds because she already sees a counseling psychologist but she can't prescribe meds. Asked if this was something they have discussed and she said no she never mentioned it, Let pt know she would have to see DJ for medications. Offered her DJ's first available and she said that's too long (eventually did take the apt but it is a VV) and \"she doesn't know if she can make it that long\". Let her know she will be on a wait list automatically but I would talk w DJ and see if we could do sooner. Advised pt that if she feels any change then to reach out to her counselor or a help line. She stated that \"they wont help her and she'll just deal with it\". Advised supervisor and supervising DO as DJ is off on Mondays.     6/16/25 @ 1:40pm- DJ had a CN tomorrow, called pt back and moved her up  "

## 2025-06-17 ENCOUNTER — OFFICE VISIT (OUTPATIENT)
Dept: PRIMARY CARE | Facility: CLINIC | Age: 50
End: 2025-06-17
Payer: COMMERCIAL

## 2025-06-17 VITALS
DIASTOLIC BLOOD PRESSURE: 76 MMHG | OXYGEN SATURATION: 96 % | WEIGHT: 248 LBS | SYSTOLIC BLOOD PRESSURE: 114 MMHG | BODY MASS INDEX: 40.64 KG/M2 | HEART RATE: 79 BPM

## 2025-06-17 DIAGNOSIS — F41.9 ANXIETY: Primary | ICD-10-CM

## 2025-06-17 PROCEDURE — 99214 OFFICE O/P EST MOD 30 MIN: CPT

## 2025-06-17 PROCEDURE — 1036F TOBACCO NON-USER: CPT

## 2025-06-17 RX ORDER — HYDROXYZINE HYDROCHLORIDE 25 MG/1
25 TABLET, FILM COATED ORAL 4 TIMES DAILY
Qty: 120 TABLET | Refills: 1 | Status: SHIPPED | OUTPATIENT
Start: 2025-06-17 | End: 2025-08-16

## 2025-06-17 RX ORDER — CLOBETASOL PROPIONATE 0.05 MG/G
1 GEL TOPICAL AS NEEDED
COMMUNITY
Start: 2025-04-18

## 2025-06-17 RX ORDER — BUPROPION HYDROCHLORIDE 150 MG/1
150 TABLET ORAL EVERY MORNING
Qty: 30 TABLET | Refills: 1 | Status: SHIPPED | OUTPATIENT
Start: 2025-06-17 | End: 2025-08-16

## 2025-06-17 RX ORDER — MINOXIDIL 2.5 MG/1
1.25 TABLET ORAL
COMMUNITY
Start: 2025-04-18

## 2025-06-17 RX ORDER — PREDNISONE 20 MG/1
60 TABLET ORAL
COMMUNITY
Start: 2025-06-15 | End: 2025-06-19

## 2025-06-17 ASSESSMENT — ENCOUNTER SYMPTOMS
CHEST TIGHTNESS: 0
DYSPHORIC MOOD: 1
WEAKNESS: 0
TROUBLE SWALLOWING: 0
SHORTNESS OF BREATH: 0
POLYDIPSIA: 0
HEADACHES: 0
DIAPHORESIS: 0
DIFFICULTY URINATING: 0
CONSTIPATION: 0
FREQUENCY: 0
POLYPHAGIA: 0
NAUSEA: 0
NERVOUS/ANXIOUS: 0
FATIGUE: 0
ABDOMINAL PAIN: 0
PALPITATIONS: 0
RECTAL PAIN: 0
DECREASED CONCENTRATION: 1
UNEXPECTED WEIGHT CHANGE: 0
DEPRESSION: 1
WOUND: 0
MYALGIAS: 0
NUMBNESS: 0
DIARRHEA: 0
CHILLS: 0
ARTHRALGIAS: 0

## 2025-06-17 ASSESSMENT — ANXIETY QUESTIONNAIRES
3. WORRYING TOO MUCH ABOUT DIFFERENT THINGS: NEARLY EVERY DAY
IF YOU CHECKED OFF ANY PROBLEMS ON THIS QUESTIONNAIRE, HOW DIFFICULT HAVE THESE PROBLEMS MADE IT FOR YOU TO DO YOUR WORK, TAKE CARE OF THINGS AT HOME, OR GET ALONG WITH OTHER PEOPLE: VERY DIFFICULT
1. FEELING NERVOUS, ANXIOUS, OR ON EDGE: NEARLY EVERY DAY
4. TROUBLE RELAXING: NEARLY EVERY DAY
7. FEELING AFRAID AS IF SOMETHING AWFUL MIGHT HAPPEN: NEARLY EVERY DAY
5. BEING SO RESTLESS THAT IT IS HARD TO SIT STILL: NOT AT ALL
2. NOT BEING ABLE TO STOP OR CONTROL WORRYING: NEARLY EVERY DAY
6. BECOMING EASILY ANNOYED OR IRRITABLE: NOT AT ALL
GAD7 TOTAL SCORE: 15

## 2025-06-17 ASSESSMENT — PATIENT HEALTH QUESTIONNAIRE - PHQ9
9. THOUGHTS THAT YOU WOULD BE BETTER OFF DEAD, OR OF HURTING YOURSELF: NOT AT ALL
SUM OF ALL RESPONSES TO PHQ QUESTIONS 1-9: 13
7. TROUBLE CONCENTRATING ON THINGS, SUCH AS READING THE NEWSPAPER OR WATCHING TELEVISION: MORE THAN HALF THE DAYS
6. FEELING BAD ABOUT YOURSELF - OR THAT YOU ARE A FAILURE OR HAVE LET YOURSELF OR YOUR FAMILY DOWN: MORE THAN HALF THE DAYS
2. FEELING DOWN, DEPRESSED OR HOPELESS: MORE THAN HALF THE DAYS
3. TROUBLE FALLING OR STAYING ASLEEP OR SLEEPING TOO MUCH: SEVERAL DAYS
8. MOVING OR SPEAKING SO SLOWLY THAT OTHER PEOPLE COULD HAVE NOTICED. OR THE OPPOSITE, BEING SO FIGETY OR RESTLESS THAT YOU HAVE BEEN MOVING AROUND A LOT MORE THAN USUAL: SEVERAL DAYS
4. FEELING TIRED OR HAVING LITTLE ENERGY: MORE THAN HALF THE DAYS
5. POOR APPETITE OR OVEREATING: MORE THAN HALF THE DAYS
1. LITTLE INTEREST OR PLEASURE IN DOING THINGS: SEVERAL DAYS
SUM OF ALL RESPONSES TO PHQ9 QUESTIONS 1 AND 2: 3

## 2025-06-17 NOTE — PROGRESS NOTES
Subjective   Patient ID: Brinda Espinosa is a 50 y.o. female who presents for Depression (Discuss anxiety as well).    Patient will undergoing some stress and feeling depressed.    Depression: Feels lack of focus day-to-day and at work.  He is concerned about starting medications that may cause weight gain as that will exacerbate her symptoms.  Would like to try Wellbutrin.  Has been on it in the past.  Will start Wellbutrin 150 mg daily.  Patient will follow-up in 1 month to report any improving or worsening symptoms.  Additionally prescribing hydroxyzine 4 times daily for daily anxiety while at work. ELROY-7 Total Score: 15 (6/17/2025  4:31 PM) Patient Health Questionnaire-2 Score: 3 (6/17/2025  4:29 PM) Patient Health Questionnaire-9 Score: 13       DepressionPatient presents with the following symptoms: decreased concentration.  Patient is not experiencing: nervousness/anxiety, palpitations, shortness of breath and suicidal ideas.           Review of Systems   Constitutional:  Negative for chills, diaphoresis, fatigue and unexpected weight change.   HENT:  Negative for dental problem, tinnitus and trouble swallowing.    Eyes:  Negative for visual disturbance.   Respiratory:  Negative for chest tightness and shortness of breath.    Cardiovascular:  Negative for chest pain, palpitations and leg swelling.   Gastrointestinal:  Negative for abdominal pain, constipation, diarrhea, nausea and rectal pain.   Endocrine: Negative for polydipsia, polyphagia and polyuria.   Genitourinary:  Negative for difficulty urinating, frequency and urgency.   Musculoskeletal:  Negative for arthralgias and myalgias.   Skin:  Negative for pallor and wound.   Neurological:  Negative for syncope, weakness, numbness and headaches.   Psychiatric/Behavioral:  Positive for decreased concentration, depression and dysphoric mood. Negative for suicidal ideas. The patient is not nervous/anxious.        Objective   /76 (BP Location: Left arm,  Patient Position: Sitting)   Pulse 79   Wt 112 kg (248 lb)   SpO2 96%   BMI 40.64 kg/m²     Physical Exam  Vitals and nursing note reviewed.   Constitutional:       General: She is not in acute distress.     Appearance: Normal appearance. She is obese.   HENT:      Head: Normocephalic.      Nose: Nose normal.      Mouth/Throat:      Mouth: Mucous membranes are moist.      Pharynx: Oropharynx is clear.   Eyes:      General: No scleral icterus.     Pupils: Pupils are equal, round, and reactive to light.   Cardiovascular:      Rate and Rhythm: Normal rate.      Pulses: Normal pulses.   Pulmonary:      Effort: Pulmonary effort is normal.   Abdominal:      General: There is no distension.      Palpations: Abdomen is soft.      Tenderness: There is no abdominal tenderness.   Musculoskeletal:         General: No swelling. Normal range of motion.      Cervical back: Normal range of motion.      Right lower leg: No edema.      Left lower leg: No edema.   Skin:     General: Skin is warm and dry.      Capillary Refill: Capillary refill takes less than 2 seconds.      Coloration: Skin is not jaundiced or pale.      Findings: No bruising, erythema or rash.   Neurological:      General: No focal deficit present.      Mental Status: She is alert and oriented to person, place, and time. Mental status is at baseline.   Psychiatric:         Mood and Affect: Mood normal.         Behavior: Behavior normal.         Thought Content: Thought content normal.         Judgment: Judgment normal.         Assessment/Plan   Diagnoses and all orders for this visit:  Anxiety  -     buPROPion XL (Wellbutrin XL) 150 mg 24 hr tablet; Take 1 tablet (150 mg) by mouth once daily in the morning. Do not crush, chew, or split.  -     hydrOXYzine HCL (Atarax) 25 mg tablet; Take 1 tablet (25 mg) by mouth 4 times a day.

## 2025-07-01 ENCOUNTER — APPOINTMENT (OUTPATIENT)
Dept: PRIMARY CARE | Facility: CLINIC | Age: 50
End: 2025-07-01
Payer: COMMERCIAL

## 2025-07-11 ENCOUNTER — TELEPHONE (OUTPATIENT)
Dept: PRIMARY CARE | Facility: CLINIC | Age: 50
End: 2025-07-11
Payer: COMMERCIAL

## 2025-07-11 DIAGNOSIS — B00.1 RECURRENT COLD SORES: ICD-10-CM

## 2025-07-11 DIAGNOSIS — G47.00 INSOMNIA, UNSPECIFIED TYPE: ICD-10-CM

## 2025-07-11 RX ORDER — VALACYCLOVIR HYDROCHLORIDE 1 G/1
TABLET, FILM COATED ORAL
Qty: 28 TABLET | Refills: 0 | Status: SHIPPED | OUTPATIENT
Start: 2025-07-11

## 2025-07-11 RX ORDER — ESZOPICLONE 3 MG/1
3 TABLET, FILM COATED ORAL NIGHTLY
Qty: 30 TABLET | Refills: 0 | Status: SHIPPED | OUTPATIENT
Start: 2025-07-11 | End: 2025-08-10

## 2025-07-18 ENCOUNTER — TELEPHONE (OUTPATIENT)
Dept: PRIMARY CARE | Facility: CLINIC | Age: 50
End: 2025-07-18
Payer: COMMERCIAL

## 2025-07-18 DIAGNOSIS — F41.9 ANXIETY: ICD-10-CM

## 2025-07-18 RX ORDER — BUPROPION HYDROCHLORIDE 150 MG/1
150 TABLET ORAL EVERY MORNING
Qty: 30 TABLET | Refills: 0 | Status: SHIPPED | OUTPATIENT
Start: 2025-07-18 | End: 2025-08-17

## 2025-08-01 ENCOUNTER — TELEPHONE (OUTPATIENT)
Dept: PRIMARY CARE | Facility: CLINIC | Age: 50
End: 2025-08-01
Payer: COMMERCIAL

## 2025-08-01 DIAGNOSIS — E66.01 MORBID OBESITY WITH BMI OF 40.0-44.9, ADULT (MULTI): ICD-10-CM

## 2025-08-29 ENCOUNTER — TELEPHONE (OUTPATIENT)
Dept: PRIMARY CARE | Facility: CLINIC | Age: 50
End: 2025-08-29
Payer: COMMERCIAL

## 2025-08-29 DIAGNOSIS — F41.9 ANXIETY: ICD-10-CM

## 2025-08-29 RX ORDER — BUPROPION HYDROCHLORIDE 150 MG/1
150 TABLET ORAL EVERY MORNING
Qty: 30 TABLET | Refills: 5 | Status: SHIPPED | OUTPATIENT
Start: 2025-08-29